# Patient Record
Sex: FEMALE | Race: WHITE | URBAN - METROPOLITAN AREA
[De-identification: names, ages, dates, MRNs, and addresses within clinical notes are randomized per-mention and may not be internally consistent; named-entity substitution may affect disease eponyms.]

---

## 2017-01-18 ENCOUNTER — TELEPHONE (OUTPATIENT)
Dept: FAMILY MEDICINE | Facility: CLINIC | Age: 57
End: 2017-01-18

## 2017-01-18 NOTE — TELEPHONE ENCOUNTER
Panel Management Review      Patient has the following on her problem list:     Asthma review     ACT Total Scores 7/6/2016   ACT TOTAL SCORE -   ASTHMA ER VISITS -   ASTHMA HOSPITALIZATIONS -   ACT TOTAL SCORE (Goal Greater than or Equal to 20) 25   In the past 12 months, how many times did you visit the emergency room for your asthma without being admitted to the hospital? 0   In the past 12 months, how many times were you hospitalized overnight because of your asthma? 0      1. Is Asthma diagnosis on the Problem List? Yes    2. Is Asthma listed on Health Maintenance? Yes    3. Patient is due for:  ACT and AAP    Hypertension   Last three blood pressure readings:  BP Readings from Last 3 Encounters:   11/08/16 148/72   09/16/16 118/78   08/03/16 120/80     Blood pressure: Failed    HTN Guidelines:  Age 18-59 BP range:  Less than 140/90  Age 60-85 with Diabetes:  Less than 140/90  Age 60-85 without Diabetes:  less than 150/90      Composite cancer screening  Chart review shows that this patient is due/due soon for the following None  Summary:    Patient is due/failing the following:   AAP, ACT and BP CHECK    Action needed:   Patient needs office visit for hypertension and asthma.    Type of outreach:    Sent ACTV8 message.    Questions for provider review:    None                                                                                                                                    Bella Dickens, [facility]       Chart routed to Care Team .

## 2017-01-25 NOTE — TELEPHONE ENCOUNTER
Patient informed of the message below, patient states she can not set up appointment at this time because she does not have her calendar, but she will call back to schedule.  Ally Kirk CMA

## 2017-02-14 ENCOUNTER — MYC MEDICAL ADVICE (OUTPATIENT)
Dept: FAMILY MEDICINE | Facility: CLINIC | Age: 57
End: 2017-02-14

## 2017-02-14 NOTE — TELEPHONE ENCOUNTER
"SH, see Mychart message and advise  Wt Readings from Last 2 Encounters:   11/08/16 182 lb (82.6 kg)   09/16/16 193 lb (87.5 kg)     Ht Readings from Last 2 Encounters:   11/08/16 5' 4.75\" (1.645 m)   07/08/16 5' 4.75\" (1.645 m)       Inez Diego RN, BSN  Message handled by Nurse Triage.    "

## 2017-03-19 DIAGNOSIS — J45.20 MILD INTERMITTENT ASTHMA, UNCOMPLICATED: ICD-10-CM

## 2017-03-21 RX ORDER — FLUTICASONE PROPIONATE 50 MCG
SPRAY, SUSPENSION (ML) NASAL
Qty: 16 ML | Refills: 1 | Status: SHIPPED | OUTPATIENT
Start: 2017-03-21 | End: 2017-05-08

## 2017-03-21 NOTE — TELEPHONE ENCOUNTER
Routing refill request to provider to review approval because:  New for , last refill 2015 for asthma diagnosis  Inez Diego RN, BSN  Message handled by Nurse Triage.

## 2017-03-21 NOTE — TELEPHONE ENCOUNTER
fluticasone (FLONASE) 50 MCG/ACT spray      Last Written Prescription Date: 12/29/15  Last Fill Quantity: 1g ,  # refills: 11   Last Office Visit with [facility], [facility] or [facility] prescribing provider: 11/8/2016                                           Next 5 appointments (look out 90 days)     Mar 22, 2017  8:00 AM CDT   Return Visit with Evin Cuevas MD   [facility] ([facility] [place])    [address]  [facility] [address]   [phone]                  EDDIE Hicks  March 21, 2017  8:50 AM

## 2017-03-22 ENCOUNTER — OFFICE VISIT (OUTPATIENT)
Dept: UROLOGY | Facility: CLINIC | Age: 57
End: 2017-03-22
Payer: COMMERCIAL

## 2017-03-22 VITALS — DIASTOLIC BLOOD PRESSURE: 76 MMHG | SYSTOLIC BLOOD PRESSURE: 140 MMHG

## 2017-03-22 DIAGNOSIS — N39.3 FEMALE STRESS INCONTINENCE: Primary | ICD-10-CM

## 2017-03-22 LAB
ALBUMIN UR-MCNC: NEGATIVE MG/DL
APPEARANCE UR: CLEAR
BILIRUB UR QL STRIP: NEGATIVE
COLOR UR AUTO: YELLOW
GLUCOSE UR STRIP-MCNC: NEGATIVE MG/DL
HGB UR QL STRIP: NEGATIVE
KETONES UR STRIP-MCNC: NEGATIVE MG/DL
LEUKOCYTE ESTERASE UR QL STRIP: ABNORMAL
NITRATE UR QL: NEGATIVE
PH UR STRIP: 5.5 PH (ref 5–7)
SP GR UR STRIP: 1.01 (ref 1–1.03)
URN SPEC COLLECT METH UR: ABNORMAL
UROBILINOGEN UR STRIP-ACNC: 0.2 EU/DL (ref 0.2–1)

## 2017-03-22 PROCEDURE — 99213 OFFICE O/P EST LOW 20 MIN: CPT | Performed by: UROLOGY

## 2017-03-22 PROCEDURE — 81003 URINALYSIS AUTO W/O SCOPE: CPT | Mod: QW | Performed by: UROLOGY

## 2017-03-22 NOTE — PROGRESS NOTES
F/u atrophic vaginitis, hx UTI, small erosion after Lynx sling 6/4/12.    Compliant with yara cream 2-3x per week.    Occas scant spotting, none today.     Denies dysuria, gross hematuria, frequency; occas noc x 1. No ERMA. No issues with intimacy. No UTI's since last visit.      Current Outpatient Prescriptions   Medication     fluticasone (FLONASE) 50 MCG/ACT spray     estradiol (ESTRACE VAGINAL) 0.1 MG/GM cream     Cholecalciferol (VITAMIN D) 2000 UNITS tablet     lisinopril (PRINIVIL,ZESTRIL) 10 MG tablet     calcium-vitamin D (CALCIUM 600 + D) 600-400 MG-UNIT per tablet     albuterol (PROAIR HFA, PROVENTIL HFA, VENTOLIN HFA) 108 (90 BASE) MCG/ACT inhaler     fexofenadine (ALLEGRA) 180 MG tablet     omega-3 fatty acids (FISH OIL) 1200 MG capsule     ALEVE 220 MG OR TABS     FLAX SEED OIL 1000 MG OR CAPS     ASPIRIN 81 MG OR TABS     MULTIVITAMIN TABS   OR     No current facility-administered medications for this visit.        PE: Excellent support at . Minimal physiologic discharge, healthy vaginal tissue. Small amnt palpable mesh on left side of urethra.      Results for orders placed or performed in visit on 03/22/17   UA without Microscopic   Result Value Ref Range    Color Urine Yellow     Appearance Urine Clear     Glucose Urine Negative NEG mg/dL    Bilirubin Urine Negative NEG    Ketones Urine Negative NEG mg/dL    Specific Gravity Urine 1.010 1.003 - 1.035    Blood Urine Negative NEG    pH Urine 5.5 5.0 - 7.0 pH    Protein Albumin Urine Negative NEG mg/dL    Urobilinogen Urine 0.2 0.2 - 1.0 EU/dL    Nitrite Urine Negative NEG    Leukocyte Esterase Urine Trace (A) NEG    Source Midstream Urine        IMP:  1. Atrophic vaginitis, improving  2. Hx UTI's, resolved  3. Small mesh erosion, assymptomatic      PLAN:  1. Discussed situation with patient in detail. In light of improving vaginal tissue and lack of negative sx's we'll continue to manage erosion conservatively    2. Continue yara cream thrice  weekly    3. RTC 6 mos to repeat vag exam or sooner prn    4. 15 minutes spent with patient, more than 50% spent in counseling and coordination of care for vaginitis/UTI.    5. Copy S Haase

## 2017-03-22 NOTE — MR AVS SNAPSHOT
After Visit Summary   3/22/2017    Mary Kate Pacheco    MRN: [id number]           Patient Information     Date Of Birth          1960        Visit Information        Provider Department      3/22/2017 8:00 AM Evin Cuevas MD [facility]        Today's Diagnoses     Female stress incontinence    -  1       Follow-ups after your visit        Follow-up notes from your care team     Return in about 6 months (around 9/22/2017).      Your next 10 appointments already scheduled     Sep 20, 2017  8:00 AM CDT   Return Visit with Evin Cuevas MD   [facility] ([facility] [place])    [address] [address] [phone]              Who to contact     If you have questions or need follow up information about today's clinic visit or your schedule please contact [facility] directly at [phone].  Normal or non-critical lab and imaging results will be communicated to you by Tyfonehart, letter or phone within 4 business days after the clinic has received the results. If you do not hear from us within 7 days, please contact the clinic through Spacebart or phone. If you have a critical or abnormal lab result, we will notify you by phone as soon as possible.  Submit refill requests through iConText or call your pharmacy and they will forward the refill request to us. Please allow 3 business days for your refill to be completed.          Additional Information About Your Visit        Tyfonehart Information     iConText gives you secure access to your electronic health record. If you see a primary care provider, you can also send messages to your care team and make appointments. If you have questions, please call your primary care clinic.  If you do not have a primary care provider, please call [phone] and they will assist  you.        Care EveryWhere ID     This is your Care EveryWhere ID. This could be used by other organizations to access your [facility] medical records  [id number]         Blood Pressure from Last 3 Encounters:   03/22/17 140/76   11/08/16 148/72   09/16/16 118/78    Weight from Last 3 Encounters:   11/08/16 182 lb (82.6 kg)   09/16/16 193 lb (87.5 kg)   08/03/16 210 lb (95.3 kg)              We Performed the Following     UA without Microscopic          Today's Medication Changes          These changes are accurate as of: 3/22/17  8:51 AM.  If you have any questions, ask your nurse or doctor.               These medicines have changed or have updated prescriptions.        Dose/Directions    calcium-vitamin D 600-400 MG-UNIT per tablet   Commonly known as:  calcium 600 + D   This may have changed:  when to take this   Used for:  Vitamin D deficiency disease        Dose:  1 tablet   Take 1 tablet by mouth 2 times daily   Quantity:  100 tablet   Refills:  3                Primary Care Provider Office Phone # Fax #    Susan Rachele Haase, APRN [phone]       [address] [address]        Thank you!     Thank you for choosing [facility]  for your care. Our goal is always to provide you with excellent care. Hearing back from our patients is one way we can continue to improve our services. Please take a few minutes to complete the written survey that you may receive in the mail after your visit with us. Thank you!             Your Updated Medication List - Protect others around you: Learn how to safely use, store and throw away your medicines at www.disposemymeds.org.          This list is accurate as of: 3/22/17  8:51 AM.  Always use your most recent med list.                   Brand Name Dispense Instructions for use    albuterol 108 (90 BASE) MCG/ACT Inhaler    PROAIR HFA/PROVENTIL HFA/VENTOLIN HFA    1 Inhaler     Inhale 2 puffs into the lungs every 4 hours as needed for shortness of breath / dyspnea       ALEVE 220 MG tablet   Generic drug:  naproxen sodium      1 TABLET EVERY 12 HOURS AS NEEDED       aspirin 81 MG tablet      1 tab po QD (Once per day)       calcium-vitamin D 600-400 MG-UNIT per tablet    calcium 600 + D    100 tablet    Take 1 tablet by mouth 2 times daily       estradiol 0.1 MG/GM cream    ESTRACE VAGINAL    42.5 g    Place 2 g vaginally twice a week       fexofenadine 180 MG tablet    ALLEGRA    90 tablet    Take 1 tablet (180 mg) by mouth daily       flax seed oil 1000 MG capsule      1 tab qd       fluticasone 50 MCG/ACT spray    FLONASE    16 mL    USE TWO SPRAYS IN EACH NOSTRIL DAILY       lisinopril 10 MG tablet    PRINIVIL/ZESTRIL    90 tablet    Take 1 tablet (10 mg) by mouth daily       MULTIVITAMIN TABS   OR      1 DAY       omega-3 fatty acids 1200 MG capsule     180 capsule    Take 1 capsule by mouth daily       vitamin D 2000 UNITS tablet     100 tablet    Take 2,000 Units by mouth daily

## 2017-05-08 ENCOUNTER — OFFICE VISIT (OUTPATIENT)
Dept: FAMILY MEDICINE | Facility: CLINIC | Age: 57
End: 2017-05-08
Payer: COMMERCIAL

## 2017-05-08 VITALS
HEIGHT: 65 IN | TEMPERATURE: 97.8 F | DIASTOLIC BLOOD PRESSURE: 70 MMHG | OXYGEN SATURATION: 99 % | RESPIRATION RATE: 10 BRPM | SYSTOLIC BLOOD PRESSURE: 120 MMHG | BODY MASS INDEX: 25.99 KG/M2 | HEART RATE: 66 BPM | WEIGHT: 156 LBS

## 2017-05-08 DIAGNOSIS — I10 HYPERTENSION GOAL BP (BLOOD PRESSURE) < 140/90: ICD-10-CM

## 2017-05-08 DIAGNOSIS — Z00.00 WELLNESS EXAMINATION: Primary | ICD-10-CM

## 2017-05-08 DIAGNOSIS — E55.9 VITAMIN D DEFICIENCY DISEASE: ICD-10-CM

## 2017-05-08 DIAGNOSIS — J45.20 MILD INTERMITTENT ASTHMA, UNCOMPLICATED: ICD-10-CM

## 2017-05-08 LAB
ALBUMIN SERPL-MCNC: 4.1 G/DL (ref 3.4–5)
ALP SERPL-CCNC: 90 U/L (ref 40–150)
ALT SERPL W P-5'-P-CCNC: 19 U/L (ref 0–50)
ANION GAP SERPL CALCULATED.3IONS-SCNC: 5 MMOL/L (ref 3–14)
AST SERPL W P-5'-P-CCNC: 17 U/L (ref 0–45)
BASOPHILS # BLD AUTO: 0.1 10E9/L (ref 0–0.2)
BASOPHILS NFR BLD AUTO: 1.4 %
BILIRUB SERPL-MCNC: 0.4 MG/DL (ref 0.2–1.3)
BUN SERPL-MCNC: 17 MG/DL (ref 7–30)
CALCIUM SERPL-MCNC: 9.4 MG/DL (ref 8.5–10.1)
CHLORIDE SERPL-SCNC: 105 MMOL/L (ref 94–109)
CHOLEST SERPL-MCNC: 223 MG/DL
CO2 SERPL-SCNC: 30 MMOL/L (ref 20–32)
CREAT SERPL-MCNC: 0.68 MG/DL (ref 0.52–1.04)
DEPRECATED CALCIDIOL+CALCIFEROL SERPL-MC: 64 UG/L (ref 20–75)
DIFFERENTIAL METHOD BLD: NORMAL
EOSINOPHIL # BLD AUTO: 0.2 10E9/L (ref 0–0.7)
EOSINOPHIL NFR BLD AUTO: 3.6 %
ERYTHROCYTE [DISTWIDTH] IN BLOOD BY AUTOMATED COUNT: 12.8 % (ref 10–15)
GFR SERPL CREATININE-BSD FRML MDRD: 88 ML/MIN/1.7M2
GLUCOSE SERPL-MCNC: 90 MG/DL (ref 70–99)
HCT VFR BLD AUTO: 40.6 % (ref 35–47)
HCV AB SERPL QL IA: NORMAL
HDLC SERPL-MCNC: 70 MG/DL
HGB BLD-MCNC: 13.5 G/DL (ref 11.7–15.7)
LDLC SERPL CALC-MCNC: 136 MG/DL
LYMPHOCYTES # BLD AUTO: 2.4 10E9/L (ref 0.8–5.3)
LYMPHOCYTES NFR BLD AUTO: 40.8 %
MCH RBC QN AUTO: 30.3 PG (ref 26.5–33)
MCHC RBC AUTO-ENTMCNC: 33.3 G/DL (ref 31.5–36.5)
MCV RBC AUTO: 91 FL (ref 78–100)
MICRO REPORT STATUS: NORMAL
MONOCYTES # BLD AUTO: 0.7 10E9/L (ref 0–1.3)
MONOCYTES NFR BLD AUTO: 11.7 %
NEUTROPHILS # BLD AUTO: 2.5 10E9/L (ref 1.6–8.3)
NEUTROPHILS NFR BLD AUTO: 42.5 %
NONHDLC SERPL-MCNC: 153 MG/DL
PLATELET # BLD AUTO: 251 10E9/L (ref 150–450)
POTASSIUM SERPL-SCNC: 3.8 MMOL/L (ref 3.4–5.3)
PROT SERPL-MCNC: 7.6 G/DL (ref 6.8–8.8)
RBC # BLD AUTO: 4.46 10E12/L (ref 3.8–5.2)
SODIUM SERPL-SCNC: 140 MMOL/L (ref 133–144)
SPECIMEN SOURCE: NORMAL
TRIGL SERPL-MCNC: 84 MG/DL
TSH SERPL DL<=0.005 MIU/L-ACNC: 2.15 MU/L (ref 0.4–4)
WBC # BLD AUTO: 5.8 10E9/L (ref 4–11)
WET PREP SPEC: NORMAL

## 2017-05-08 PROCEDURE — 99396 PREV VISIT EST AGE 40-64: CPT | Performed by: NURSE PRACTITIONER

## 2017-05-08 PROCEDURE — 87210 SMEAR WET MOUNT SALINE/INK: CPT | Performed by: NURSE PRACTITIONER

## 2017-05-08 PROCEDURE — 80061 LIPID PANEL: CPT | Performed by: NURSE PRACTITIONER

## 2017-05-08 PROCEDURE — 82306 VITAMIN D 25 HYDROXY: CPT | Performed by: NURSE PRACTITIONER

## 2017-05-08 PROCEDURE — 36415 COLL VENOUS BLD VENIPUNCTURE: CPT | Performed by: NURSE PRACTITIONER

## 2017-05-08 PROCEDURE — 80050 GENERAL HEALTH PANEL: CPT | Performed by: NURSE PRACTITIONER

## 2017-05-08 PROCEDURE — 86803 HEPATITIS C AB TEST: CPT | Performed by: NURSE PRACTITIONER

## 2017-05-08 RX ORDER — ALBUTEROL SULFATE 90 UG/1
2 AEROSOL, METERED RESPIRATORY (INHALATION) EVERY 4 HOURS PRN
Qty: 1 INHALER | Refills: 11 | Status: SHIPPED | OUTPATIENT
Start: 2017-05-08 | End: 2018-04-18

## 2017-05-08 RX ORDER — FLUTICASONE PROPIONATE 50 MCG
2 SPRAY, SUSPENSION (ML) NASAL DAILY
Qty: 16 ML | Refills: 11 | Status: SHIPPED | OUTPATIENT
Start: 2017-05-08

## 2017-05-08 RX ORDER — LISINOPRIL 10 MG/1
10 TABLET ORAL DAILY
Qty: 90 TABLET | Refills: 3 | Status: SHIPPED | OUTPATIENT
Start: 2017-05-08 | End: 2018-04-11

## 2017-05-08 NOTE — PROGRESS NOTES
"   SUBJECTIVE:     CC: Mary Kate Pacheco is an 56 year old woman who presents for preventive health visit.     Physical   Annual:     Getting at least 3 servings of Calcium per day::  NO    Bi-annual eye exam::  Yes    Dental care twice a year::  Yes    Sleep apnea or symptoms of sleep apnea::  None    Diet::  Other    Frequency of exercise::  4-5 days/week    Duration of exercise::  Less than 15 minutes    Taking medications regularly::  Yes    Medication side effects::  Not applicable and None    Additional concerns today::  YES    Additional concerns:   Left shoulder: patient reports that her left shoulder feels \"bumpy\" and is itchy around the area. She has tried to keep it hydrated with lotion, but has not tried any hydrocortisone creams.    Vaginal symptoms: she reports of spotting with some discharge. She is followed by urology who instructed her to use an estrogen cream a couple times per day. Was told that her \"vaginal walls are possibly thinning out\" that could be linked to the spotting. Has a history of a total hysterectomy      Weight loss: patient has a lost a total of 81lbs since starting the [facility] [employer] weight loss program. She has been taking meal replacements with fruit, snacks on protein bars, has lunch full of veggies with healthy fats and lean protein, and well as starches. Her goal is to weigh around 150lbs     Today's PHQ-2 Score:   PHQ-2 ( 1999 Pfizer) 5/1/2017   Little interest or pleasure in doing things Not at all   Feeling down, depressed or hopeless Not at all   PHQ-2 Score 0       Abuse: Current or Past(Physical, Sexual or Emotional)- No  Do you feel safe in your environment - Yes    Social History   Substance Use Topics     Smoking status: Never Smoker     Smokeless tobacco: Never Used     Alcohol use 0.0 oz/week     0 Standard drinks or equivalent per week      Comment: very rarely     The patient does not drink >3 drinks per day nor >7 drinks per week.    Recent Labs   Lab Test  " 10/12/15   0914  10/02/14   1057   CHOL  206*  218*   HDL  60  66   LDL  122  134*   TRIG  121  92   CHOLHDLRATIO  3.4  3.3       Reviewed orders with patient.  Reviewed health maintenance and updated orders accordingly - Yes    Mammo Decision Support:  Patient over age 50, mutual decision to screen reflected in health maintenance.    Pertinent mammograms are reviewed under the imaging tab.  History of abnormal Pap smear: Status post benign hysterectomy. Health Maintenance and Surgical History updated.    Reviewed and updated as needed this visit by clinical staff  Tobacco  Allergies  Med Hx  Surg Hx  Fam Hx  Soc Hx        Reviewed and updated as needed this visit by Provider            ROS:  C: NEGATIVE for fever, chills, change in weight  INTEGUMENTARY/SKIN: See health history   E: NEGATIVE for vision changes or irritation  ENT: NEGATIVE for ear, mouth and throat problems  R: NEGATIVE for significant cough or SOB  B: NEGATIVE for masses, tenderness or discharge  CV: NEGATIVE for chest pain, palpitations or peripheral edema  GI: NEGATIVE for nausea, abdominal pain, heartburn, or change in bowel habits   menopausal female: See health history   M: NEGATIVE for significant arthralgias or myalgia  N: NEGATIVE for weakness, dizziness or paresthesias  P: NEGATIVE for changes in mood or affect     Problem list, Medication list, Allergies, and Medical/Social/Surgical histories reviewed in EPIC and updated as appropriate.  BP Readings from Last 3 Encounters:   05/08/17 120/70   03/22/17 140/76   11/08/16 148/72    Wt Readings from Last 3 Encounters:   05/08/17 156 lb (70.8 kg)   11/08/16 182 lb (82.6 kg)   09/16/16 193 lb (87.5 kg)               Patient Active Problem List   Diagnosis     Allergic rhinitis     Premature menopause     [email or web address]     Acute pancreatitis     Female stress incontinence     Mild intermittent asthma     HYPERLIPIDEMIA LDL GOAL <160     Obesity     Hypertension goal BP (blood pressure) <  140/90     Vitamin D deficiency disease     Past Surgical History:   Procedure Laterality Date     C LAMINEC/FACETECT/FORAMIN,LUMBAR 1 SEG  2004    L5-S1     C NONSPECIFIC PROCEDURE      HUAN & BSO     C NONSPECIFIC PROCEDURE      TL     C NONSPECIFIC PROCEDURE      breast reduction     C NONSPECIFIC PROCEDURE      ? kidney stones     C TOTAL ABDOM HYSTERECTOMY  1996     and BSO.  Benign reasons.      CHOLECYSTECTOMY, LAPOROSCOPIC  2007     SLING TRANSPUBO WITHOUT ANTERIOR COLPORRHAPHY  2012    Procedure:SLING TRANSPUBO WITHOUT ANTERIOR COLPORRHAPHY; Pubo Vaginal Sling; Surgeon:SWATI VIEYRA; Location:[facility] OR     SURGICAL HISTORY OF 06    L5-S1 anterior and posterior fusion       Social History   Substance Use Topics     Smoking status: Never Smoker     Smokeless tobacco: Never Used     Alcohol use 0.0 oz/week     0 Standard drinks or equivalent per week      Comment: very rarely     Family History   Problem Relation Age of Onset     Hypertension Father      glaucoma     Thyroid Disease Father      elevated thyroid     HEART DISEASE Father      pacemaker in his late 70's for bradycardia      Family History Negative Mother      Cancer - colorectal Maternal Grandmother      late 70's     C.A.D. Maternal Grandmother       83 MI and CVA     OSTEOPOROSIS Maternal Grandmother      Alzheimer Disease Paternal Grandmother       at 93     Circulatory Paternal Grandfather       brain aneurysm in his 40's      Family History Negative Sister      Family History Negative Sister      Family History Negative Son      Family History Negative Son      Family History Negative Daughter          Current Outpatient Prescriptions   Medication Sig Dispense Refill     fluticasone (FLONASE) 50 MCG/ACT spray USE TWO SPRAYS IN EACH NOSTRIL DAILY 16 mL 1     estradiol (ESTRACE VAGINAL) 0.1 MG/GM cream Place 2 g vaginally twice a week 42.5 g 3     Cholecalciferol (VITAMIN D) 2000 UNITS tablet Take  2,000 Units by mouth daily 100 tablet 3     lisinopril (PRINIVIL,ZESTRIL) 10 MG tablet Take 1 tablet (10 mg) by mouth daily 90 tablet 3     calcium-vitamin D (CALCIUM 600 + D) 600-400 MG-UNIT per tablet Take 1 tablet by mouth 2 times daily (Patient taking differently: Take 1 tablet by mouth daily ) 100 tablet 3     albuterol (PROAIR HFA, PROVENTIL HFA, VENTOLIN HFA) 108 (90 BASE) MCG/ACT inhaler Inhale 2 puffs into the lungs every 4 hours as needed for shortness of breath / dyspnea 1 Inhaler 11     fexofenadine (ALLEGRA) 180 MG tablet Take 1 tablet (180 mg) by mouth daily 90 tablet 3     omega-3 fatty acids (FISH OIL) 1200 MG capsule Take 1 capsule by mouth daily 180 capsule 12     ALEVE 220 MG OR TABS 1 TABLET EVERY 12 HOURS AS NEEDED       FLAX SEED OIL 1000 MG OR CAPS 1 tab qd       ASPIRIN 81 MG OR TABS 1 tab po QD (Once per day)       MULTIVITAMIN TABS   OR 1 DAY       Allergies   Allergen Reactions     Dust Mites      itchy runny nose sneezing     Ibuprofen      cancer sores with high doses     Mold      itchy runny nose sneezing     Seasonal Allergies      itchy runny nose sneezing     Recent Labs   Lab Test  10/12/15   0914  10/02/14   1057  09/26/13   1906   LDL  122  134*  121   HDL  60  66  58   TRIG  121  92  101   ALT  22  19  33   CR  0.78  0.78  0.75   GFRESTIMATED  76  77  80   GFRESTBLACK  >90   GFR Calc    >90   GFR Calc    >90   POTASSIUM  4.2  4.6  3.8   TSH  1.50  1.21  2.24      This document serves as a record of the services and decisions personally performed and made by Susan Haase, APRN CNP. It was created on her behalf by Jes Mendoza, a trained medical scribe. The creation of this document is based on the scribe's personal observations and the provider's statements to the medical scribe.  Jes Mendoza, May 8, 2017 7:44 AM    OBJECTIVE:     /70 (BP Location: Left arm, Patient Position: Chair, Cuff Size: Adult Regular)  Pulse 66  Temp 97.8  F (36.6  C)  "(Oral)  Resp 10  Ht 5' 4.75\" (1.645 m)  Wt 156 lb (70.8 kg)  SpO2 99%  BMI 26.16 kg/m2  EXAM:  GENERAL APPEARANCE: obese, alert and no distress  EYES: Eyes grossly normal to inspection, PERRL and conjunctivae and sclerae normal  HENT: ear canals and TM's normal, nose and mouth without ulcers or lesions, oropharynx clear and oral mucous membranes moist  NECK: no adenopathy, no asymmetry, masses, or scars and thyroid normal to palpation  RESP: lungs clear to auscultation - no rales, rhonchi or wheezes  BREAST: normal without masses, tenderness or nipple discharge and no palpable axillary masses or adenopathy  CV: regular rate and rhythm, normal S1 S2, no S3 or S4, no murmur, click or rub, no peripheral edema and peripheral pulses strong  ABDOMEN: soft, nontender, no hepatosplenomegaly, no masses and bowel sounds normal   (female): normal female external genitalia, normal urethral meatus, vaginal mucosal atrophy noted, uterus surgically absent, No masses or abnormal discharge  MS: no musculoskeletal defects are noted and gait is age appropriate without ataxia  SKIN: no suspicious lesions or rashes  NEURO: Normal strength and tone, sensory exam grossly normal, mentation intact and speech normal  PSYCH: mentation appears normal and affect normal/bright    ASSESSMENT/PLAN:       Mary Kate was seen today for physical and pre visit planning - done.    Diagnoses and all orders for this visit:    Wellness examination  -     Comprehensive metabolic panel  -     Lipid panel reflex to direct LDL  -     CBC with platelets differential  -     TSH with free T4 reflex  -     Vitamin D Deficiency  -     Hepatitis C Screen Reflex to HCV RNA Quant and Genotype  -     Wet prep    Hypertension goal BP (blood pressure) < 140/90: w ell controlled on lisinopril  -     Comprehensive metabolic panel     lisinopril (PRINIVIL/ZESTRIL) 10 MG tablet; Take 1 tablet (10 mg) by mouth daily  Vitamin D deficiency disease    Mild intermittent " "asthma, uncomplicated:  Well controlled  -     fluticasone (FLONASE) 50 MCG/ACT spray; Spray 2 sprays into both nostrils daily  -     albuterol (PROAIR HFA/PROVENTIL HFA/VENTOLIN HFA) 108 (90 BASE) MCG/ACT Inhaler; Inhale 2 puffs into the lungs every 4 hours as needed for shortness of breath / dyspnea          COUNSELING:  Special attention given to:        Regular exercise       Healthy diet/nutrition       Consider Hep C screening for patients born between 1945 and 1965       reports that she has never smoked. She has never used smokeless tobacco.  Estimated body mass index is 26.16 kg/(m^2) as calculated from the following:    Height as of this encounter: 5' 4.75\" (1.645 m).    Weight as of this encounter: 156 lb (70.8 kg).   Weight management plan: healthy diet and exercise    Counseling Resources:  ATP IV Guidelines  Pooled Cohorts Equation Calculator  Breast Cancer Risk Calculator  FRAX Risk Assessment  ICSI Preventive Guidelines  Dietary Guidelines for Americans, 2010  USDA's MyPlate  ASA Prophylaxis  Lung CA Screening    The information in this document, created by the medical scribe for me, accurately reflects the services I personally performed and the decisions made by me. I have reviewed and approved this document for accuracy prior to leaving the patient care area.  Follow up in 1 year, sooner as needed.  Susan Haase, APRN [facility]"

## 2017-05-08 NOTE — MR AVS SNAPSHOT
After Visit Summary   5/8/2017    Mary Kate Pacheco    MRN: [id number]           Patient Information     Date Of Birth          1960        Visit Information        Provider Department      5/8/2017 7:30 AM Haase, Susan Rachele, APRN CNP [facility]        Today's Diagnoses     Wellness examination    -  1    Hypertension goal BP (blood pressure) < 140/90        Vitamin D deficiency disease        Mild intermittent asthma without complication        Mild intermittent asthma, uncomplicated        Essential hypertension with goal blood pressure less than 140/90           Follow-ups after your visit        Follow-up notes from your care team     Return in about 1 year (around 5/8/2018).      Your next 10 appointments already scheduled     Sep 20, 2017  8:00 AM CDT   Return Visit with Evin Cuevas MD   [facility] ([facility] [place])    [address]  [facility] [phone]              Who to contact     If you have questions or need follow up information about today's clinic visit or your schedule please contact [facility] directly at [phone].  Normal or non-critical lab and imaging results will be communicated to you by MyChart, letter or phone within 4 business days after the clinic has received the results. If you do not hear from us within 7 days, please contact the clinic through MyChart or phone. If you have a critical or abnormal lab result, we will notify you by phone as soon as possible.  Submit refill requests through [employer]Greek or call your pharmacy and they will forward the refill request to us. Please allow 3 business days for your refill to be completed.          Additional Information About Your Visit        MyChart Information     myGreek gives you secure access to your electronic health record. If you see a primary care provider, you  "can also send messages to your care team and make appointments. If you have questions, please call your primary care clinic.  If you do not have a primary care provider, please call [phone] and they will assist you.        Care EveryWhere ID     This is your Care EveryWhere ID. This could be used by other organizations to access your [facility] medical records  [id number]        Your Vitals Were     Pulse Temperature Respirations Height Pulse Oximetry BMI (Body Mass Index)    66 97.8  F (36.6  C) (Oral) 10 5' 4.75\" (1.645 m) 99% 26.16 kg/m2       Blood Pressure from Last 3 Encounters:   05/08/17 120/70   03/22/17 140/76   11/08/16 148/72    Weight from Last 3 Encounters:   05/08/17 156 lb (70.8 kg)   11/08/16 182 lb (82.6 kg)   09/16/16 193 lb (87.5 kg)              We Performed the Following     CBC with platelets differential     Comprehensive metabolic panel     Hepatitis C Screen Reflex to HCV RNA Quant and Genotype     Lipid panel reflex to direct LDL     TSH with free T4 reflex     Vitamin D Deficiency     Wet prep          Today's Medication Changes          These changes are accurate as of: 5/8/17  8:09 AM.  If you have any questions, ask your nurse or doctor.               These medicines have changed or have updated prescriptions.        Dose/Directions    calcium-vitamin D 600-400 MG-UNIT per tablet   Commonly known as:  calcium 600 + D   This may have changed:  when to take this   Used for:  Vitamin D deficiency disease        Dose:  1 tablet   Take 1 tablet by mouth 2 times daily   Quantity:  100 tablet   Refills:  3       fluticasone 50 MCG/ACT spray   Commonly known as:  FLONASE   This may have changed:  See the new instructions.   Used for:  Mild intermittent asthma, uncomplicated   Changed by:  Haase, Susan Rachele, APRN CNP        Dose:  2 spray   Spray 2 sprays into both nostrils daily   Quantity:  16 mL   Refills:  11            Where to get your medicines      These medications were sent to " [facility] [address] [facility] - [place], [place] - [address] [facility]  [address] [facility] [address]     Phone:  [phone]     albuterol 108 (90 BASE) MCG/ACT Inhaler    fluticasone 50 MCG/ACT spray    lisinopril 10 MG tablet                Primary Care Provider Office Phone # Fax #    Susan Rachele Haase, APRN -[phone]       [facility] [address]  [facility] [address]        Thank you!     Thank you for choosing [facility]  for your care. Our goal is always to provide you with excellent care. Hearing back from our patients is one way we can continue to improve our services. Please take a few minutes to complete the written survey that you may receive in the mail after your visit with us. Thank you!             Your Updated Medication List - Protect others around you: Learn how to safely use, store and throw away your medicines at www.disposemymeds.org.          This list is accurate as of: 5/8/17  8:09 AM.  Always use your most recent med list.                   Brand Name Dispense Instructions for use    albuterol 108 (90 BASE) MCG/ACT Inhaler    PROAIR HFA/PROVENTIL HFA/VENTOLIN HFA    1 Inhaler    Inhale 2 puffs into the lungs every 4 hours as needed for shortness of breath / dyspnea       ALEVE 220 MG tablet   Generic drug:  naproxen sodium      1 TABLET EVERY 12 HOURS AS NEEDED       aspirin 81 MG tablet      1 tab po QD (Once per day)       calcium-vitamin D 600-400 MG-UNIT per tablet    calcium 600 + D    100 tablet    Take 1 tablet by mouth 2 times daily       estradiol 0.1 MG/GM cream    ESTRACE VAGINAL    42.5 g    Place 2 g vaginally twice a week       fexofenadine 180 MG tablet    ALLEGRA    90 tablet    Take 1 tablet (180 mg) by mouth daily       flax seed oil 1000 MG capsule      1 tab qd       fluticasone 50 MCG/ACT spray    FLONASE    16 mL    Spray 2 sprays into both nostrils daily       lisinopril 10 MG tablet    PRINIVIL/ZESTRIL    90  tablet    Take 1 tablet (10 mg) by mouth daily       MULTIVITAMIN TABS   OR      1 DAY       omega-3 fatty acids 1200 MG capsule     180 capsule    Take 1 capsule by mouth daily       vitamin D 2000 UNITS tablet     100 tablet    Take 2,000 Units by mouth daily

## 2017-05-08 NOTE — NURSING NOTE
"Chief Complaint   Patient presents with     Physical     Pre Visit Planning - Done       Initial /70 (BP Location: Left arm, Patient Position: Chair, Cuff Size: Adult Regular)  Pulse 66  Temp 97.8  F (36.6  C) (Oral)  Resp 10  Ht 5' 4.75\" (1.645 m)  Wt 156 lb (70.8 kg)  SpO2 99%  BMI 26.16 kg/m2 Estimated body mass index is 26.16 kg/(m^2) as calculated from the following:    Height as of this encounter: 5' 4.75\" (1.645 m).    Weight as of this encounter: 156 lb (70.8 kg).  Medication Reconciliation: complete   Bella Dickens CMA      "

## 2017-05-08 NOTE — PROGRESS NOTES
Marcin Hartmann,  Your CBC (checks for infection and anemia) was normal.  Sincerely,     Susan Haase, CNP

## 2017-05-09 ASSESSMENT — ASTHMA QUESTIONNAIRES: ACT_TOTALSCORE: 25

## 2017-05-09 NOTE — PROGRESS NOTES
Marcin Hartmann,  Your lab results are as below:  1)  TSH (thyroid level) 2.15 which is normal (range 0.4-4)  2)  Cholesterol was elevated at 223, your LDL (bad cholesterol) and your HDL (good cholesterol) were within normal range. Continue to follow a low cholesterol diet and we will recheck this in 1 year.  3)  Glucose was normal at 90  (normal fasting is <100).  4)  Vitamin D level was normal at 64.   5)  Hepatitis C test was negative.    If you have any questions do not hesitate to call the clinic to discuss the results with me further.     Sincerely,    Susan Haase, CNP

## 2017-05-16 DIAGNOSIS — J45.20 MILD INTERMITTENT ASTHMA, UNCOMPLICATED: ICD-10-CM

## 2017-05-16 NOTE — TELEPHONE ENCOUNTER
FLUTICASONE PROP 50 MCG SPRAY       Last Written Prescription Date: 05/08/2017  Last Fill Quantity: , #16 ml  refills: 11    Last Office Visit with G, [facility] or [facility] prescribing provider:  05/08/2017 Susan Haase.   Future Office Visit:       Date of Last Asthma Action Plan Letter:   Asthma Action Plan Q1 Year    Topic Date Due     Asthma Action Plan - yearly  01/18/2018      Asthma Control Test:   ACT Total Scores 5/8/2017   ACT TOTAL SCORE (Goal Greater than or Equal to 20) 25   In the past 12 months, how many times did you visit the emergency room for your asthma without being admitted to the hospital? 0   In the past 12 months, how many times were you hospitalized overnight because of your asthma? 0       Date of Last Spirometry Test:   No results found for this or any previous visit.

## 2017-05-18 RX ORDER — FLUTICASONE PROPIONATE 50 MCG
SPRAY, SUSPENSION (ML) NASAL
Qty: 16 ML | Refills: 11 | Status: SHIPPED | OUTPATIENT
Start: 2017-05-18 | End: 2017-09-26

## 2017-06-05 ENCOUNTER — MYC MEDICAL ADVICE (OUTPATIENT)
Dept: FAMILY MEDICINE | Facility: CLINIC | Age: 57
End: 2017-06-05

## 2017-06-05 NOTE — TELEPHONE ENCOUNTER
SH, see Mychart message and advise chigger bites treatment  Inez Diego RN, BSN  Message handled by Nurse Triage.

## 2017-07-18 DIAGNOSIS — I10 ESSENTIAL HYPERTENSION WITH GOAL BLOOD PRESSURE LESS THAN 140/90: ICD-10-CM

## 2017-07-18 NOTE — TELEPHONE ENCOUNTER
Pending Prescriptions:                       Disp   Refills    lisinopril (PRINIVIL/ZESTRIL) 10 MG table*90 tab*2            Sig: TAKE 1 TABLET (10 MG) BY MOUTH DAILY              Last Written Prescription Date: 5/8/2017  Last Fill Quantity: 90, # refills: 3  Last Office Visit with [facility], P or [facility] prescribing provider: 5/8/2017, Haase       Potassium   Date Value Ref Range Status   05/08/2017 3.8 3.4 - 5.3 mmol/L Final     Creatinine   Date Value Ref Range Status   05/08/2017 0.68 0.52 - 1.04 mg/dL Final     BP Readings from Last 3 Encounters:   05/08/17 120/70   03/22/17 140/76   11/08/16 148/72

## 2017-07-20 RX ORDER — LISINOPRIL 10 MG/1
TABLET ORAL
Qty: 90 TABLET | Refills: 2 | OUTPATIENT
Start: 2017-07-20

## 2017-09-26 ENCOUNTER — TELEPHONE (OUTPATIENT)
Dept: OBGYN | Facility: CLINIC | Age: 57
End: 2017-09-26

## 2017-09-26 ENCOUNTER — OFFICE VISIT (OUTPATIENT)
Dept: OBGYN | Facility: CLINIC | Age: 57
End: 2017-09-26
Payer: COMMERCIAL

## 2017-09-26 VITALS
HEIGHT: 65 IN | SYSTOLIC BLOOD PRESSURE: 148 MMHG | BODY MASS INDEX: 26.82 KG/M2 | WEIGHT: 161 LBS | DIASTOLIC BLOOD PRESSURE: 76 MMHG

## 2017-09-26 DIAGNOSIS — T83.711A: Primary | ICD-10-CM

## 2017-09-26 DIAGNOSIS — R39.15 URGENCY OF URINATION: ICD-10-CM

## 2017-09-26 DIAGNOSIS — N39.41 URGE INCONTINENCE: ICD-10-CM

## 2017-09-26 PROCEDURE — 99204 OFFICE O/P NEW MOD 45 MIN: CPT | Performed by: OBSTETRICS & GYNECOLOGY

## 2017-09-26 ASSESSMENT — ANXIETY QUESTIONNAIRES
IF YOU CHECKED OFF ANY PROBLEMS ON THIS QUESTIONNAIRE, HOW DIFFICULT HAVE THESE PROBLEMS MADE IT FOR YOU TO DO YOUR WORK, TAKE CARE OF THINGS AT HOME, OR GET ALONG WITH OTHER PEOPLE: NOT DIFFICULT AT ALL
2. NOT BEING ABLE TO STOP OR CONTROL WORRYING: NOT AT ALL
GAD7 TOTAL SCORE: 0
7. FEELING AFRAID AS IF SOMETHING AWFUL MIGHT HAPPEN: NOT AT ALL
3. WORRYING TOO MUCH ABOUT DIFFERENT THINGS: NOT AT ALL
6. BECOMING EASILY ANNOYED OR IRRITABLE: NOT AT ALL
5. BEING SO RESTLESS THAT IT IS HARD TO SIT STILL: NOT AT ALL
1. FEELING NERVOUS, ANXIOUS, OR ON EDGE: NOT AT ALL

## 2017-09-26 ASSESSMENT — PATIENT HEALTH QUESTIONNAIRE - PHQ9
SUM OF ALL RESPONSES TO PHQ QUESTIONS 1-9: 0
5. POOR APPETITE OR OVEREATING: NOT AT ALL

## 2017-09-26 NOTE — MR AVS SNAPSHOT
"              After Visit Summary   9/26/2017    Mary Kate Pacheco    MRN: [id number]           Patient Information     Date Of Birth          1960        Visit Information        Provider Department      9/26/2017 9:00 AM Artemio Bhatti MD [facility]        Today's Diagnoses     Vaginal erosion due to surgical mesh, initial encounter (H)    -  1    Urgency of urination        Urge incontinence           Follow-ups after your visit        Who to contact     If you have questions or need follow up information about today's clinic visit or your schedule please contact [facility] directly at [phone].  Normal or non-critical lab and imaging results will be communicated to you by MyChart, letter or phone within 4 business days after the clinic has received the results. If you do not hear from us within 7 days, please contact the clinic through Compare[employer]way[employer] or phone. If you have a critical or abnormal lab result, we will notify you by phone as soon as possible.  Submit refill requests through [employer] or call your pharmacy and they will forward the refill request to us. Please allow 3 business days for your refill to be completed.          Additional Information About Your Visit        MyChart Information     [facility] [facility] gives you secure access to your electronic health record. If you see a primary care provider, you can also send messages to your care team and make appointments. If you have questions, please call your primary care clinic.  If you do not have a primary care provider, please call [phone] and they will assist you.        Care EveryWhere ID     This is your Care EveryWhere ID. This could be used by other organizations to access your [facility] medical records  [id number]        Your Vitals Were     Height Breastfeeding? BMI (Body Mass Index)             5' 4.75\" (1.645 m) No 27 kg/m2          Blood Pressure from Last 3 Encounters:   09/26/17 148/76 "   05/08/17 120/70   03/22/17 140/76    Weight from Last 3 Encounters:   09/26/17 161 lb (73 kg)   05/08/17 156 lb (70.8 kg)   11/08/16 182 lb (82.6 kg)              Today, you had the following     No orders found for display         Today's Medication Changes          These changes are accurate as of: 9/26/17 10:03 AM.  If you have any questions, ask your nurse or doctor.               These medicines have changed or have updated prescriptions.        Dose/Directions    calcium-vitamin D 600-400 MG-UNIT per tablet   Commonly known as:  calcium 600 + D   This may have changed:  when to take this   Used for:  Vitamin D deficiency disease        Dose:  1 tablet   Take 1 tablet by mouth 2 times daily   Quantity:  100 tablet   Refills:  3                Primary Care Provider Office Phone # Fax #    Chayo Seymour Haase, APRN -[phone]       [address] [facility] [address]        Equal Access to Services     LESTER KING AH: Hadii aad ku hadasho Sonadeen, waaxda luqadaha, qaybta kaalmada adeegyada, iveth mills . So [facility] [phone].    ATENCIÓN: Si tomla prabhu, tiene a steen disposición servicios gratuitos de asistencia lingüística. Llame al [phone].    We comply with applicable federal civil rights laws and [place] laws. We do not discriminate on the basis of race, color, national origin, age, disability sex, sexual orientation or gender identity.            Thank you!     Thank you for choosing [facility] [facility]  for your care. Our goal is always to provide you with excellent care. Hearing back from our patients is one way we can continue to improve our services. Please take a few minutes to complete the written survey that you may receive in the mail after your visit with us. Thank you!             Your Updated Medication List - Protect others around you: Learn how to safely use, store and throw away your medicines at www.disposemymeds.org.           This list is accurate as of: 9/26/17 10:03 AM.  Always use your most recent med list.                   Brand Name Dispense Instructions for use Diagnosis    albuterol 108 (90 BASE) MCG/ACT Inhaler    PROAIR HFA/PROVENTIL HFA/VENTOLIN HFA    1 Inhaler    Inhale 2 puffs into the lungs every 4 hours as needed for shortness of breath / dyspnea    Mild intermittent asthma, uncomplicated       ALEVE 220 MG tablet   Generic drug:  naproxen sodium      1 TABLET EVERY 12 HOURS AS NEEDED    Thoracic or lumbosacral neuritis or radiculitis, unspecified       aspirin 81 MG tablet      1 tab po QD (Once per day)        calcium-vitamin D 600-400 MG-UNIT per tablet    calcium 600 + D    100 tablet    Take 1 tablet by mouth 2 times daily    Vitamin D deficiency disease       fexofenadine 180 MG tablet    ALLEGRA    90 tablet    Take 1 tablet (180 mg) by mouth daily    Allergic rhinitis, cause unspecified       flax seed oil 1000 MG capsule      1 tab qd        fluticasone 50 MCG/ACT spray    FLONASE    16 mL    Spray 2 sprays into both nostrils daily    Mild intermittent asthma, uncomplicated       lisinopril 10 MG tablet    PRINIVIL/ZESTRIL    90 tablet    Take 1 tablet (10 mg) by mouth daily    Hypertension goal BP (blood pressure) < 140/90       MULTIVITAMIN TABS   OR      1 DAY        omega-3 fatty acids 1200 MG capsule     180 capsule    Take 1 capsule by mouth daily    Routine general medical examination at a health care facility       vitamin D 2000 UNITS tablet     100 tablet    Take 2,000 Units by mouth daily    Vitamin D deficiency disease

## 2017-09-26 NOTE — PROGRESS NOTES
SUBJECTIVE:                                                   Mary Kate Pacheco is a 57 year old female who presents to clinic today for the following health issue(s):  Patient presents with:  Urinary Problem: Here to discuss Stress Incontinence      Additional information: referred by Dr. Cuevas- [facility] [place]     HPI:  In 2012 the patient had a retropubic sling.  Presently year ago she noted some discharge and spotting.  It was noted that there was erosion of the sling on the patient's left side.  She was started on estrogen vaginal cream.  She now complains of discharge with some bright red bleeding at times associated with a mucousy discharge.  She has minimal stress incontinence.  She does complain of urgency and urge incontinence.    No LMP recorded. Patient has had a hysterectomy..   Patient is sexually active, No obstetric history on file..  Using hysterectomy for contraception.    reports that she has never smoked. She has never used smokeless tobacco.      STD testing offered?  Declined    Health maintenance updated:  yes    Today's PHQ-2 Score:   PHQ-2 ( 1999 [employer]) 5/8/2017   Q1: Little interest or pleasure in doing things 0   Q2: Feeling down, depressed or hopeless 0   PHQ-2 Score 0   Q1: Little interest or pleasure in doing things -   Q2: Feeling down, depressed or hopeless -   PHQ-2 Score -     Today's PHQ-9 Score:   PHQ-9 SCORE 9/26/2017   Total Score 0     Today's TARA-7 Score:   TARA-7 SCORE 9/26/2017   Total Score 0       Problem list and histories reviewed & adjusted, as indicated.  Additional history: as documented.    Patient Active Problem List   Diagnosis     Allergic rhinitis     Premature menopause     iamLUMBAGO     Acute pancreatitis     Female stress incontinence     Mild intermittent asthma     Hyperlipidemia LDL goal <160     Obesity     Hypertension goal BP (blood pressure) < 140/90     Vitamin D deficiency disease     Past Surgical History:    Procedure Laterality Date     C LAMINEC/FACETECT/FORAMIN,LUMBAR 1 SEG  2004    L5-S1     C NONSPECIFIC PROCEDURE      HUAN & BSO     C NONSPECIFIC PROCEDURE      TL     C NONSPECIFIC PROCEDURE      breast reduction     C NONSPECIFIC PROCEDURE      ? kidney stones     C TOTAL ABDOM HYSTERECTOMY  1996     and BSO.  Benign reasons.      CHOLECYSTECTOMY, LAPOROSCOPIC  2007     SLING TRANSPUBO WITHOUT ANTERIOR COLPORRHAPHY  2012    Procedure:SLING TRANSPUBO WITHOUT ANTERIOR COLPORRHAPHY; Pubo Vaginal Sling; Surgeon:SWATI VIEYRA; Location:[facility] OR     SURGICAL HISTORY OF -   06    L5-S1 anterior and posterior fusion      Social History   Substance Use Topics     Smoking status: Never Smoker     Smokeless tobacco: Never Used     Alcohol use 0.0 oz/week     0 Standard drinks or equivalent per week      Comment: very rarely      Problem (# of Occurrences) Relation (Name,Age of Onset)    Alzheimer Disease (1) Paternal Grandmother:  at 93    C.A.D. (1) Maternal Grandmother:  83 MI and CVA    Cancer - colorectal (1) Maternal Grandmother: late 70's    Circulatory (1) Paternal Grandfather:  brain aneurysm in his 40's     Family History Negative (6) Mother, Sister, Sister, Son, Son, Daughter    HEART DISEASE (1) Father: pacemaker in his late 70's for bradycardia     Hypertension (1) Father: glaucoma    OSTEOPOROSIS (1) Maternal Grandmother    Thyroid Disease (1) Father: elevated thyroid            Current Outpatient Prescriptions   Medication Sig     fluticasone (FLONASE) 50 MCG/ACT spray Spray 2 sprays into both nostrils daily     lisinopril (PRINIVIL/ZESTRIL) 10 MG tablet Take 1 tablet (10 mg) by mouth daily     Cholecalciferol (VITAMIN D) 2000 UNITS tablet Take 2,000 Units by mouth daily     calcium-vitamin D (CALCIUM 600 + D) 600-400 MG-UNIT per tablet Take 1 tablet by mouth 2 times daily (Patient taking differently: Take 1 tablet by mouth daily )     fexofenadine (ALLEGRA) 180 MG  "tablet Take 1 tablet (180 mg) by mouth daily     omega-3 fatty acids (FISH OIL) 1200 MG capsule Take 1 capsule by mouth daily     ALEVE 220 MG OR TABS 1 TABLET EVERY 12 HOURS AS NEEDED     FLAX SEED OIL 1000 MG OR CAPS 1 tab qd     ASPIRIN 81 MG OR TABS 1 tab po QD (Once per day)     MULTIVITAMIN TABS   OR 1 DAY     albuterol (PROAIR HFA/PROVENTIL HFA/VENTOLIN HFA) 108 (90 BASE) MCG/ACT Inhaler Inhale 2 puffs into the lungs every 4 hours as needed for shortness of breath / dyspnea (Patient not taking: Reported on 9/26/2017)     No current facility-administered medications for this visit.      Allergies   Allergen Reactions     Dust Mites      itchy runny nose sneezing     Ibuprofen      cancer sores with high doses     Mold      itchy runny nose sneezing     Seasonal Allergies      itchy runny nose sneezing       ROS:  12 point review of systems negative other than symptoms noted below.  Genitourinary: Spotting, Urgency and Vaginal Discharge  Endocrine: Cold Intolerance, Decreased Libido and Loss of Hair    OBJECTIVE:     /76  Ht 5' 4.75\" (1.645 m)  Wt 161 lb (73 kg)  Breastfeeding? No  BMI 27 kg/m2  Body mass index is 27 kg/(m^2).    Exam:  Constitutional:  Appearance: Well nourished, well developed alert, in no acute distress  Chest:  Respiratory Effort:  Breathing unlabored  Cardiovascular: no edema  Gastrointestinal:  Abdominal Examination:  Abdomen nontender to palpation,   Lymphatic: Lymph Nodes:  No other lymphadenopathy present  Skin:General Inspection:  No rashes present, no lesions present, no areas of discoloration; Genitalia and Groin:  No rashes present, no lesions present, no areas of discoloration, no masses present.  Neurologic/Psychiatric:  Mental Status:  Oriented X3   Pelvic Exam:  External Genitalia:     Normal appearance for age, no discharge present, no tenderness present, no inflammatory lesions present, color normal  Vagina:     The sling is palpable underneath the urethra with " exposed mesh on the left side.  Surgery is slightly tender.  Bladder:     Nontender to palpation  Urethra:   Urethral Body:  Urethra palpation normal, urethra structural support normal   Urethral Meatus:  No erythema or lesions present  Cervix:     Surgically absent  Uterus:     Surgically absent  Adnexa:     Surgically absent  Perineum:     Perineum within normal limits, no evidence of trauma, no rashes or skin lesions present  Anus:     Anus within normal limits, no hemorrhoids present  Inguinal Lymph Nodes:     No lymphadenopathy present       In-Clinic Test Results:  No results found for this or any previous visit (from the past 24 hour(s)).    ASSESSMENT/PLAN:                                                        ICD-10-CM    1. Vaginal erosion due to surgical mesh, initial encounter (H) T83.711A    2. Urgency of urination R39.15    3. Urge incontinence N39.41            Plan: The patient will require a mesh revision with excision of the exposed mesh underneath the urethra.  After 3 months if necessary a repeat sling can be performed.  She'll continue or resume using her estrogen vaginal cream after the surgery.    Artemio Bhatti MD  [facility]    Amount of time needed for the procedure:  1 hour    Expected time off from work:  1 week  Surgeon:  Artemio Bhatti MD  Surgical Procedure:  Revision and removal of suburethral sling, cystoscopy  Preop Needed:  Yes with  PCP  Location for surgery to performed:   Surgery Outpatient  Anesthesia:  General     Allergies   Allergen Reactions     Dust Mites      itchy runny nose sneezing     Ibuprofen      cancer sores with high doses     Mold      itchy runny nose sneezing     Seasonal Allergies      itchy runny nose sneezing       DIAGNOSIS:  Mesh erosion of the vagina    Special Instructions:

## 2017-09-26 NOTE — TELEPHONE ENCOUNTER
Patient surgery scheduled on 10/16/2017 at 7:30am Check in 5:30am  Location for surgery to performed:   Surgery Outpatient  Scheduled by [place] 9/26/2017     Information Packet given :Yes: HANDED 9/26/2017    CPT codes given: Yes    79320        Consents signed? N/A  Rep Informed :N/A    PREOP DATE :  PRIMARY [facility] [place]  In Epic :Yes    On Spreadsheet :Yes    On Calendar EB  :No    In [place] Calendar RONALDO  :Yes    Assist NONE   Assist in Epic NA  Assist Notified as needed :No     Chelly Hearn  Surgery Scheduler      Amount of time needed for the procedure:  1 hour                                            Expected time off from work:  1 week  Surgeon:  Artemio Bhatti MD  Surgical Procedure:  Revision and removal of suburethral sling, cystoscopy  Preop Needed:  Yes with  PCP  Location for surgery to performed:   Surgery Outpatient  Anesthesia:  General            Allergies   Allergen Reactions     Dust Mites         itchy runny nose sneezing     Ibuprofen         cancer sores with high doses     Mold         itchy runny nose sneezing     Seasonal Allergies         itchy runny nose sneezing         DIAGNOSIS:  Mesh erosion of the vagina     Special Instructions:

## 2017-09-27 ASSESSMENT — ANXIETY QUESTIONNAIRES: GAD7 TOTAL SCORE: 0

## 2017-10-11 ENCOUNTER — OFFICE VISIT (OUTPATIENT)
Dept: FAMILY MEDICINE | Facility: CLINIC | Age: 57
End: 2017-10-11
Payer: COMMERCIAL

## 2017-10-11 VITALS
TEMPERATURE: 98.5 F | WEIGHT: 161 LBS | RESPIRATION RATE: 12 BRPM | OXYGEN SATURATION: 98 % | HEART RATE: 74 BPM | DIASTOLIC BLOOD PRESSURE: 70 MMHG | SYSTOLIC BLOOD PRESSURE: 120 MMHG | BODY MASS INDEX: 27 KG/M2

## 2017-10-11 DIAGNOSIS — Z01.818 PREOP GENERAL PHYSICAL EXAM: Primary | ICD-10-CM

## 2017-10-11 DIAGNOSIS — N39.3 FEMALE STRESS INCONTINENCE: ICD-10-CM

## 2017-10-11 LAB
ALBUMIN SERPL-MCNC: 3.9 G/DL (ref 3.4–5)
ALP SERPL-CCNC: 82 U/L (ref 40–150)
ALT SERPL W P-5'-P-CCNC: 20 U/L (ref 0–50)
ANION GAP SERPL CALCULATED.3IONS-SCNC: 9 MMOL/L (ref 3–14)
AST SERPL W P-5'-P-CCNC: 12 U/L (ref 0–45)
BASOPHILS # BLD AUTO: 0.1 10E9/L (ref 0–0.2)
BASOPHILS NFR BLD AUTO: 1 %
BILIRUB SERPL-MCNC: 0.4 MG/DL (ref 0.2–1.3)
BUN SERPL-MCNC: 22 MG/DL (ref 7–30)
CALCIUM SERPL-MCNC: 9.8 MG/DL (ref 8.5–10.1)
CHLORIDE SERPL-SCNC: 105 MMOL/L (ref 94–109)
CO2 SERPL-SCNC: 27 MMOL/L (ref 20–32)
CREAT SERPL-MCNC: 0.66 MG/DL (ref 0.52–1.04)
DIFFERENTIAL METHOD BLD: NORMAL
EOSINOPHIL # BLD AUTO: 0.3 10E9/L (ref 0–0.7)
EOSINOPHIL NFR BLD AUTO: 4.8 %
ERYTHROCYTE [DISTWIDTH] IN BLOOD BY AUTOMATED COUNT: 12.6 % (ref 10–15)
GFR SERPL CREATININE-BSD FRML MDRD: >90 ML/MIN/1.7M2
GLUCOSE SERPL-MCNC: 77 MG/DL (ref 70–99)
HCT VFR BLD AUTO: 40.6 % (ref 35–47)
HGB BLD-MCNC: 13.2 G/DL (ref 11.7–15.7)
LYMPHOCYTES # BLD AUTO: 2.1 10E9/L (ref 0.8–5.3)
LYMPHOCYTES NFR BLD AUTO: 36.2 %
MCH RBC QN AUTO: 29.7 PG (ref 26.5–33)
MCHC RBC AUTO-ENTMCNC: 32.5 G/DL (ref 31.5–36.5)
MCV RBC AUTO: 91 FL (ref 78–100)
MONOCYTES # BLD AUTO: 0.8 10E9/L (ref 0–1.3)
MONOCYTES NFR BLD AUTO: 12.9 %
NEUTROPHILS # BLD AUTO: 2.7 10E9/L (ref 1.6–8.3)
NEUTROPHILS NFR BLD AUTO: 45.1 %
PLATELET # BLD AUTO: 237 10E9/L (ref 150–450)
POTASSIUM SERPL-SCNC: 4 MMOL/L (ref 3.4–5.3)
PROT SERPL-MCNC: 7.6 G/DL (ref 6.8–8.8)
RBC # BLD AUTO: 4.44 10E12/L (ref 3.8–5.2)
SODIUM SERPL-SCNC: 141 MMOL/L (ref 133–144)
WBC # BLD AUTO: 5.9 10E9/L (ref 4–11)

## 2017-10-11 PROCEDURE — 99214 OFFICE O/P EST MOD 30 MIN: CPT | Performed by: NURSE PRACTITIONER

## 2017-10-11 PROCEDURE — 80053 COMPREHEN METABOLIC PANEL: CPT | Performed by: NURSE PRACTITIONER

## 2017-10-11 PROCEDURE — 93000 ELECTROCARDIOGRAM COMPLETE: CPT | Performed by: NURSE PRACTITIONER

## 2017-10-11 PROCEDURE — 85025 COMPLETE CBC W/AUTO DIFF WBC: CPT | Performed by: NURSE PRACTITIONER

## 2017-10-11 PROCEDURE — 36415 COLL VENOUS BLD VENIPUNCTURE: CPT | Performed by: NURSE PRACTITIONER

## 2017-10-11 RX ORDER — MULTIVITAMIN,THERAPEUTIC
1 TABLET ORAL DAILY
COMMUNITY

## 2017-10-11 RX ORDER — OMEGA-3-ACID ETHYL ESTERS 1 G/1
1 CAPSULE, LIQUID FILLED ORAL DAILY
COMMUNITY

## 2017-10-11 NOTE — PROGRESS NOTES
[facility]  [address] [address]  [phone]  Dept: [phone]    PRE-OP EVALUATION:  Today's date: 10/11/2017    Mary Kate Pacheco (: 1960) presents for pre-operative evaluation assessment as requested by Dr. Artemio Bhatti.  She requires evaluation and anesthesia risk assessment prior to undergoing surgery/procedure for treatment of vaginal erosion .  Proposed procedure: REVISION AND REMOVAL OF SUBURETHRAL SLING AND CYSTOSCOPY    Date of Surgery/ Procedure: 10/16/2017  Time of Surgery/ Procedure: 7:30 am  Hospital/Surgical Facility: [facility]  Primary Physician: Haase, Susan Rachele  Type of Anesthesia Anticipated: General    Patient has a Health Care Directive or Living Will:  NO    Preop Questions 10/8/2017   1.  Do you have a history of heart attack, stroke, stent, bypass or surgery on an artery in the head, neck, heart or legs? No   2.  Do you ever have any pain or discomfort in your chest? No   3.  Do you have a history of  Heart Failure? No   4.   Are you troubled by shortness of breath when:  walking on a level surface, or up a slight hill, or at night? No   5.  Do you currently have a cold, bronchitis or other respiratory infection? No   6.  Do you have a cough, shortness of breath, or wheezing? No   7.  Do you sometimes get pains in the calves of your legs when you walk? No   8. Do you or anyone in your family have previous history of blood clots? No   9.  Do you or does anyone in your family have a serious bleeding problem such as prolonged bleeding following surgeries or cuts? No   10. Have you ever had problems with anemia or been told to take iron pills? No   11. Have you had any abnormal blood loss such as black, tarry or bloody stools, or abnormal vaginal bleeding? No   12. Have you ever had a blood transfusion? YES - 10 years ago    13. Have you or any of your relatives ever had problems with anesthesia? YES -  vomiting and nausea    14. Do you have sleep apnea, excessive snoring or daytime drowsiness? No   15. Do you have any prosthetic heart valves? No   16. Do you have prosthetic joints? No   17. Is there any chance that you may be pregnant? No           HPI:                                                      Brief HPI related to upcoming procedure: Mary Kate Pacheco is a 57 year old female who presents today for pre-op for revision and removal of suburethral sling and cytoscopy. She is feeling well overall, denies URI symptoms.     Continues to have vaginal bleeding and discharge. No prior surgical complications, however has anesthesia side effects including nausea and vomiting prior.     HYPERTENSION - BP is controlled, 120/70. Patient has longstanding history of HTN , currently denies any symptoms referable to elevated blood pressure. Specifically denies chest pain, palpitations, dyspnea, orthopnea, PND or peripheral edema. Blood pressure readings have been in normal range. Current medication regimen is as listed below. Patient denies any side effects of medication.                                                                                                                                                                                                                                          .  ASTHMA - Patient has a longstanding history of mild intermittent Asthma . Patient has been doing well overall and continues on daily allegra and flonase, last used albuterol several months ago.    ACT score is 25                                                                                                                                           MEDICAL HISTORY:                                                    Patient Active Problem List    Diagnosis Date Noted     Vitamin D deficiency disease 10/02/2014     Priority: Medium     Hypertension goal BP (blood pressure) < 140/90 06/19/2013     Priority: Medium     Obesity  10/01/2011     Priority: Medium     Hyperlipidemia LDL goal <160 10/31/2010     Priority: Medium     The 10-year ASCVD risk score (Warrengaro KING Jr, et al., 2013) is: 2.9%    Values used to calculate the score:      Age: 56 years      Sex: Female      Is Non- : No      Diabetic: No      Tobacco smoker: No      Systolic Blood Pressure: 120 mmHg      Is BP treated: Yes      HDL Cholesterol: 70 mg/dL      Total Cholesterol: 223 mg/dL       Mild intermittent asthma      Priority: Medium     trigger spring/fall allergies       Female stress incontinence 09/27/2007     Priority: Medium     Acute pancreatitis 08/16/2007     Priority: Medium     iamLUMBAGO 09/08/2005     Priority: Medium     Allergic rhinitis 06/13/2003     Priority: Medium     Problem list name updated by automated process. Provider to review       Premature menopause 06/13/2003     Priority: Medium     Surgical menopause benign reasons age 35        Past Medical History:   Diagnosis Date     Acute pancreatitis 7/2006     Allergic rhinitis, cause unspecified      Asthma      Cholecystitis, unspecified 7/2006     Hypertension      Mild intermittent asthma     trigger spring/fall allergies     Other and unspecified disc disorder of lumbar region      PONV (postoperative nausea and vomiting)      Symptomatic states associated with artificial menopause      Past Surgical History:   Procedure Laterality Date     C LAMINEC/FACETECT/FORAMIN,LUMBAR 1 SEG  2004    L5-S1     C NONSPECIFIC PROCEDURE  6/96    HUAN & BSO     C NONSPECIFIC PROCEDURE  1990    TL     C NONSPECIFIC PROCEDURE  1988    breast reduction     C NONSPECIFIC PROCEDURE  1997    ? kidney stones     C TOTAL ABDOM HYSTERECTOMY  6/1996     and BSO.  Benign reasons.      CHOLECYSTECTOMY, LAPOROSCOPIC  7/2007     SLING TRANSPUBO WITHOUT ANTERIOR COLPORRHAPHY  6/4/2012    Procedure:SLING TRANSPUBO WITHOUT ANTERIOR COLPORRHAPHY; Pubo Vaginal Sling; Surgeon:SWATI VIEYRA; Location:  OR     SURGICAL HISTORY OF - 1-25-06    L5-S1 anterior and posterior fusion     Current Outpatient Prescriptions   Medication Sig Dispense Refill     fluticasone (FLONASE) 50 MCG/ACT spray Spray 2 sprays into both nostrils daily 16 mL 11     albuterol (PROAIR HFA/PROVENTIL HFA/VENTOLIN HFA) 108 (90 BASE) MCG/ACT Inhaler Inhale 2 puffs into the lungs every 4 hours as needed for shortness of breath / dyspnea (Patient not taking: Reported on 9/26/2017) 1 Inhaler 11     lisinopril (PRINIVIL/ZESTRIL) 10 MG tablet Take 1 tablet (10 mg) by mouth daily 90 tablet 3     Cholecalciferol (VITAMIN D) 2000 UNITS tablet Take 2,000 Units by mouth daily 100 tablet 3     calcium-vitamin D (CALCIUM 600 + D) 600-400 MG-UNIT per tablet Take 1 tablet by mouth 2 times daily (Patient taking differently: Take 1 tablet by mouth daily ) 100 tablet 3     fexofenadine (ALLEGRA) 180 MG tablet Take 1 tablet (180 mg) by mouth daily 90 tablet 3     omega-3 fatty acids (FISH OIL) 1200 MG capsule Take 1 capsule by mouth daily 180 capsule 12     ALEVE 220 MG OR TABS 1 TABLET EVERY 12 HOURS AS NEEDED       FLAX SEED OIL 1000 MG OR CAPS 1 tab qd       ASPIRIN 81 MG OR TABS 1 tab po QD (Once per day)       MULTIVITAMIN TABS   OR 1 DAY       OTC products: none    Allergies   Allergen Reactions     Dust Mites      itchy runny nose sneezing     Ibuprofen      cancer sores with high doses     Mold      itchy runny nose sneezing     Seasonal Allergies      itchy runny nose sneezing      Latex Allergy: NO    Social History   Substance Use Topics     Smoking status: Never Smoker     Smokeless tobacco: Never Used     Alcohol use 0.0 oz/week     0 Standard drinks or equivalent per week      Comment: very rarely     History   Drug Use No       REVIEW OF SYSTEMS:                                                    C: NEGATIVE for fever, chills, change in weight  I: NEGATIVE for worrisome rashes, moles or lesions  E: NEGATIVE for vision changes or irritation  E/M:  NEGATIVE for ear, mouth and throat problems  R: NEGATIVE for significant cough or SOB  CV: NEGATIVE for chest pain, palpitations or peripheral edema  GI: NEGATIVE for nausea, abdominal pain, heartburn, or change in bowel habits  : NEGATIVE for frequency, dysuria, or hematuria. Abnormal vaginal bleeding  M: NEGATIVE for significant arthralgias or myalgia  N: NEGATIVE for weakness, dizziness or paresthesias  E: NEGATIVE for temperature intolerance, skin/hair changes  H: NEGATIVE for bleeding problems  P: NEGATIVE for changes in mood or affect    This document serves as a record of the services and decisions personally performed and made by Susan Haase, CNP. It was created on her behalf by Jessa Barrett, a trained medical scribe. The creation of this document is based on the provider's statements to the medical scribe.  Jessa Barrett 8:03 AM October 11, 2017    EXAM:                                                    /70 (BP Location: Left arm, Patient Position: Chair, Cuff Size: Adult Regular)  Pulse 74  Temp 98.5  F (36.9  C) (Oral)  Resp 12  Wt 73 kg (161 lb)  SpO2 98%  BMI 27 kg/m2    GENERAL APPEARANCE: healthy, alert and no distress     EYES: EOMI, PERRL     HENT: ear canals and TM's normal and nose and mouth without ulcers or lesions     NECK: no adenopathy, no asymmetry, masses, or scars and thyroid normal to palpation     RESP: lungs clear to auscultation - no rales, rhonchi or wheezes     CV: regular rates and rhythm, normal S1 S2, no S3 or S4 and no murmur, click or rub     ABDOMEN:  soft, nontender, no HSM or masses and bowel sounds normal     MS: extremities normal- no gross deformities noted, no evidence of inflammation in joints, FROM in all extremities.     SKIN: no suspicious lesions or rashes     NEURO: Normal strength and tone, sensory exam grossly normal, mentation intact and speech normal     PSYCH: mentation appears normal. and affect normal/bright     LYMPHATICS: No cervical, or  supraclavicular nodes    DIAGNOSTICS:                                                    EKG: appears normal, NSR, Normal Sinus Rhythm, normal axis, normal intervals, no acute ST/T changes c/w ischemia, no LVH by voltage criteria, unchanged from previous tracings  HGB:  13.2    IMPRESSION:                                                    Reason for surgery/procedure: REVISION AND REMOVAL OF SUBURETHRAL SLING AND CYSTOSCOPY  Diagnosis/reason for consult: Pre - Op     The proposed surgical procedure is considered INTERMEDIATE risk.    REVISED CARDIAC RISK INDEX  The patient has the following serious cardiovascular risks for perioperative complications such as (MI, PE, VFib and 3  AV Block):  No serious cardiac risks  INTERPRETATION: 0 risks: Class I (very low risk - 0.4% complication rate)    The patient has the following additional risks for perioperative complications:  No identified additional risks    No diagnosis found.    RECOMMENDATIONS:                                                    Mary Kate was seen today for pre-op exam.    Diagnoses and all orders for this visit:    Preop general physical exam  -     CBC with platelets differential  -     Comprehensive metabolic panel  -     EKG 12-lead complete w/read - Clinics    Female stress incontinence    --ACE Inhibitor or Angiotensin Receptor Blocker (ARB) Use  Ace inhibitor or Angiotensin Receptor Blocker (ARB) and should HOLD this medication for the 24 hours prior to surgery.    Approval given to proceed with proposed procedure, without further diagnostic evaluation  Is aware of NPO orders and need to refrain from taking fish oil, NSAIDS, Aspirin prior to surgery.     The information in this document, created by the medical scribe for me, accurately reflects the services I personally performed and the decisions made by me. I have reviewed and approved this document for accuracy prior to leaving the patient care area.  October 11, 2017 8:10 AM  Signed  Electronically by: Susan Haase, APRN CNP    Copy of this evaluation report is provided to requesting physician.    [place] Preop Guidelines

## 2017-10-11 NOTE — MR AVS SNAPSHOT
After Visit Summary   10/11/2017    Mary Kate Pacheco    MRN: [id number]           Patient Information     Date Of Birth          1960        Visit Information        Provider Department      10/11/2017 8:00 AM Haase, Susan Rachele, APRN CNP [facility]        Today's Diagnoses     Preop general physical exam    -  1      Care Instructions      Before Your Surgery      Call your surgeon if there is any change in your health. This includes signs of a cold or flu (such as a sore throat, runny nose, cough, rash or fever).    Do not smoke, drink alcohol or take over the counter medicine (unless your surgeon or primary care doctor tells you to) for the 24 hours before and after surgery.    If you take prescribed drugs: Follow your doctor s orders about which medicines to take and which to stop until after surgery.    Eating and drinking prior to surgery: follow the instructions from your surgeon    Take a shower or bath the night before surgery. Use the soap your surgeon gave you to gently clean your skin. If you do not have soap from your surgeon, use your regular soap. Do not shave or scrub the surgery site.  Wear clean pajamas and have clean sheets on your bed.           Follow-ups after your visit        Follow-up notes from your care team     Return if symptoms worsen or fail to improve.      Your next 10 appointments already scheduled     Oct 16, 2017   Procedure with Artemio Bhatti MD   [facility] (--)    [address]  [facility] [id number] [phone]              Who to contact     If you have questions or need follow up information about today's clinic visit or your schedule please contact [facility] directly at [phone].  Normal or non-critical lab and imaging results will be communicated to you by MyChart, letter or phone within 4 business days after the clinic has received the results. If you do not  hear from us within 7 days, please contact the clinic through [facility] or phone. If you have a critical or abnormal lab result, we will notify you by phone as soon as possible.  Submit refill requests through [facility] or call your pharmacy and they will forward the refill request to us. Please allow 3 business days for your refill to be completed.          Additional Information About Your Visit        [employer] Information     [facility] gives you secure access to your electronic health record. If you see a primary care provider, you can also send messages to your care team and make appointments. If you have questions, please call your primary care clinic.  If you do not have a primary care provider, please call [phone] and they will assist you.        Care EveryWhere ID     This is your Care EveryWhere ID. This could be used by other organizations to access your [place] medical records  [id number]        Your Vitals Were     Pulse Temperature Respirations Pulse Oximetry BMI (Body Mass Index)       74 98.5  F (36.9  C) (Oral) 12 98% 27 kg/m2        Blood Pressure from Last 3 Encounters:   10/11/17 120/70   09/26/17 148/76   05/08/17 120/70    Weight from Last 3 Encounters:   10/11/17 161 lb (73 kg)   09/26/17 161 lb (73 kg)   05/08/17 156 lb (70.8 kg)              We Performed the Following     CBC with platelets differential     Comprehensive metabolic panel     EKG 12-lead complete w/read - Clinics          Today's Medication Changes          These changes are accurate as of: 10/11/17  8:32 AM.  If you have any questions, ask your nurse or doctor.               These medicines have changed or have updated prescriptions.        Dose/Directions    calcium-vitamin D 600-400 MG-UNIT per tablet   Commonly known as:  calcium 600 + D   This may have changed:  when to take this   Used for:  Vitamin D deficiency disease        Dose:  1 tablet   Take 1 tablet by mouth 2 times daily   Quantity:  100 tablet   Refills:  3                 Primary Care Provider Office Phone # Fax #    Susan Rachele Haase, APRN [phone]       [address] [address] [facility] [address]        Equal Access to Services     LESTER KING : Hadii aad ku hadannabellao Sokayali, waaxda luqadaha, qaybta kaalmada adeednayada, iveth julien laMadelinemoon shelby. So [facility] [phone].    ATENCIÓN: Si habla español, tiene a steen disposición servicios gratuitos de asistencia lingüística. Llame al [phone].    We comply with applicable federal civil rights laws and [place] laws. We do not discriminate on the basis of race, color, national origin, age, disability, sex, sexual orientation, or gender identity.            Thank you!     Thank you for choosing [facility]  for your care. Our goal is always to provide you with excellent care. Hearing back from our patients is one way we can continue to improve our services. Please take a few minutes to complete the written survey that you may receive in the mail after your visit with us. Thank you!             Your Updated Medication List - Protect others around you: Learn how to safely use, store and throw away your medicines at www.disposemymeds.org.          This list is accurate as of: 10/11/17  8:32 AM.  Always use your most recent med list.                   Brand Name Dispense Instructions for use Diagnosis    albuterol 108 (90 BASE) MCG/ACT Inhaler    PROAIR HFA/PROVENTIL HFA/VENTOLIN HFA    1 Inhaler    Inhale 2 puffs into the lungs every 4 hours as needed for shortness of breath / dyspnea    Mild intermittent asthma, uncomplicated       ALEVE 220 MG tablet   Generic drug:  naproxen sodium      1 TABLET EVERY 12 HOURS AS NEEDED    Thoracic or lumbosacral neuritis or radiculitis, unspecified       aspirin 81 MG tablet      1 tab po QD (Once per day)        BIOTIN PO           calcium-vitamin D 600-400 MG-UNIT per tablet    calcium 600 + D    100 tablet    Take 1 tablet by mouth 2  times daily    Vitamin D deficiency disease       fexofenadine 180 MG tablet    ALLEGRA    90 tablet    Take 1 tablet (180 mg) by mouth daily    Allergic rhinitis, cause unspecified       flax seed oil 1000 MG capsule      1 tab qd        fluticasone 50 MCG/ACT spray    FLONASE    16 mL    Spray 2 sprays into both nostrils daily    Mild intermittent asthma, uncomplicated       lisinopril 10 MG tablet    PRINIVIL/ZESTRIL    90 tablet    Take 1 tablet (10 mg) by mouth daily    Hypertension goal BP (blood pressure) < 140/90       MULTIVITAMIN TABS   OR      1 DAY        omega-3 fatty acids 1200 MG capsule     180 capsule    Take 1 capsule by mouth daily    Routine general medical examination at a health care facility       vitamin D 2000 UNITS tablet     100 tablet    Take 2,000 Units by mouth daily    Vitamin D deficiency disease

## 2017-10-11 NOTE — PROGRESS NOTES
Marcin Hartmann,  Your CBC (checks for anemia and infection) was normal.  I also had another provider review the EKG and all looks good for surgery!  Sincerely,     Susan Haase, CNP

## 2017-10-11 NOTE — NURSING NOTE
"Chief Complaint   Patient presents with     Pre-Op Exam       Initial /70 (BP Location: Left arm, Patient Position: Chair, Cuff Size: Adult Regular)  Pulse 74  Temp 98.5  F (36.9  C) (Oral)  Resp 12  Wt 161 lb (73 kg)  SpO2 98%  BMI 27 kg/m2 Estimated body mass index is 27 kg/(m^2) as calculated from the following:    Height as of 9/26/17: 5' 4.75\" (1.645 m).    Weight as of this encounter: 161 lb (73 kg).  Medication Reconciliation: complete   Bella Dickens CMA      "

## 2017-10-12 ASSESSMENT — ASTHMA QUESTIONNAIRES: ACT_TOTALSCORE: 25

## 2017-10-12 NOTE — H&P (VIEW-ONLY)
[facility]  [address] [address] [facility] [address]  [phone]  Dept: [phone]    PRE-OP EVALUATION:  Today's date: 10/11/2017    Mary Kate Pacheco (: 1960) presents for pre-operative evaluation assessment as requested by Dr. Artemio Bhatti.  She requires evaluation and anesthesia risk assessment prior to undergoing surgery/procedure for treatment of vaginal erosion .  Proposed procedure: REVISION AND REMOVAL OF SUBURETHRAL SLING AND CYSTOSCOPY    Date of Surgery/ Procedure: 10/16/2017  Time of Surgery/ Procedure: 7:30 am  Hospital/Surgical Facility: [facility]  Primary Physician: Haase, Susan Rachele  Type of Anesthesia Anticipated: General    Patient has a Health Care Directive or Living Will:  NO    Preop Questions 10/8/2017   1.  Do you have a history of heart attack, stroke, stent, bypass or surgery on an artery in the head, neck, heart or legs? No   2.  Do you ever have any pain or discomfort in your chest? No   3.  Do you have a history of  Heart Failure? No   4.   Are you troubled by shortness of breath when:  walking on a level surface, or up a slight hill, or at night? No   5.  Do you currently have a cold, bronchitis or other respiratory infection? No   6.  Do you have a cough, shortness of breath, or wheezing? No   7.  Do you sometimes get pains in the calves of your legs when you walk? No   8. Do you or anyone in your family have previous history of blood clots? No   9.  Do you or does anyone in your family have a serious bleeding problem such as prolonged bleeding following surgeries or cuts? No   10. Have you ever had problems with anemia or been told to take iron pills? No   11. Have you had any abnormal blood loss such as black, tarry or bloody stools, or abnormal vaginal bleeding? No   12. Have you ever had a blood transfusion? YES - 10 years ago    13. Have you or any of your relatives ever had problems with anesthesia? YES -  vomiting and nausea    14. Do you have sleep apnea, excessive snoring or daytime drowsiness? No   15. Do you have any prosthetic heart valves? No   16. Do you have prosthetic joints? No   17. Is there any chance that you may be pregnant? No           HPI:                                                      Brief HPI related to upcoming procedure: Mary Kate Pacheco is a 57 year old female who presents today for pre-op for revision and removal of suburethral sling and cytoscopy. She is feeling well overall, denies URI symptoms.     Continues to have vaginal bleeding and discharge. No prior surgical complications, however has anesthesia side effects including nausea and vomiting prior.     HYPERTENSION - BP is controlled, 120/70. Patient has longstanding history of HTN , currently denies any symptoms referable to elevated blood pressure. Specifically denies chest pain, palpitations, dyspnea, orthopnea, PND or peripheral edema. Blood pressure readings have been in normal range. Current medication regimen is as listed below. Patient denies any side effects of medication.                                                                                                                                                                                                                                          .  ASTHMA - Patient has a longstanding history of mild intermittent Asthma . Patient has been doing well overall and continues on daily allegra and flonase, last used albuterol several months ago.    ACT score is 25                                                                                                                                           MEDICAL HISTORY:                                                    Patient Active Problem List    Diagnosis Date Noted     Vitamin D deficiency disease 10/02/2014     Priority: Medium     Hypertension goal BP (blood pressure) < 140/90 06/19/2013     Priority: Medium     Obesity  10/01/2011     Priority: Medium     Hyperlipidemia LDL goal <160 10/31/2010     Priority: Medium     The 10-year ASCVD risk score (Warrengaro KING Jr, et al., 2013) is: 2.9%    Values used to calculate the score:      Age: 56 years      Sex: Female      Is Non- : No      Diabetic: No      Tobacco smoker: No      Systolic Blood Pressure: 120 mmHg      Is BP treated: Yes      HDL Cholesterol: 70 mg/dL      Total Cholesterol: 223 mg/dL       Mild intermittent asthma      Priority: Medium     trigger spring/fall allergies       Female stress incontinence 09/27/2007     Priority: Medium     Acute pancreatitis 08/16/2007     Priority: Medium     iamLUMBAGO 09/08/2005     Priority: Medium     Allergic rhinitis 06/13/2003     Priority: Medium     Problem list name updated by automated process. Provider to review       Premature menopause 06/13/2003     Priority: Medium     Surgical menopause benign reasons age 35        Past Medical History:   Diagnosis Date     Acute pancreatitis 7/2006     Allergic rhinitis, cause unspecified      Asthma      Cholecystitis, unspecified 7/2006     Hypertension      Mild intermittent asthma     trigger spring/fall allergies     Other and unspecified disc disorder of lumbar region      PONV (postoperative nausea and vomiting)      Symptomatic states associated with artificial menopause      Past Surgical History:   Procedure Laterality Date     C LAMINEC/FACETECT/FORAMIN,LUMBAR 1 SEG  2004    L5-S1     C NONSPECIFIC PROCEDURE  6/96    HUAN & BSO     C NONSPECIFIC PROCEDURE  1990    TL     C NONSPECIFIC PROCEDURE  1988    breast reduction     C NONSPECIFIC PROCEDURE  1997    ? kidney stones     C TOTAL ABDOM HYSTERECTOMY  6/1996     and BSO.  Benign reasons.      CHOLECYSTECTOMY, LAPOROSCOPIC  7/2007     SLING TRANSPUBO WITHOUT ANTERIOR COLPORRHAPHY  6/4/2012    Procedure:SLING TRANSPUBO WITHOUT ANTERIOR COLPORRHAPHY; Pubo Vaginal Sling; Surgeon:SWATI VIERYA; Location:  OR     SURGICAL HISTORY OF - 1-25-06    L5-S1 anterior and posterior fusion     Current Outpatient Prescriptions   Medication Sig Dispense Refill     fluticasone (FLONASE) 50 MCG/ACT spray Spray 2 sprays into both nostrils daily 16 mL 11     albuterol (PROAIR HFA/PROVENTIL HFA/VENTOLIN HFA) 108 (90 BASE) MCG/ACT Inhaler Inhale 2 puffs into the lungs every 4 hours as needed for shortness of breath / dyspnea (Patient not taking: Reported on 9/26/2017) 1 Inhaler 11     lisinopril (PRINIVIL/ZESTRIL) 10 MG tablet Take 1 tablet (10 mg) by mouth daily 90 tablet 3     Cholecalciferol (VITAMIN D) 2000 UNITS tablet Take 2,000 Units by mouth daily 100 tablet 3     calcium-vitamin D (CALCIUM 600 + D) 600-400 MG-UNIT per tablet Take 1 tablet by mouth 2 times daily (Patient taking differently: Take 1 tablet by mouth daily ) 100 tablet 3     fexofenadine (ALLEGRA) 180 MG tablet Take 1 tablet (180 mg) by mouth daily 90 tablet 3     omega-3 fatty acids (FISH OIL) 1200 MG capsule Take 1 capsule by mouth daily 180 capsule 12     ALEVE 220 MG OR TABS 1 TABLET EVERY 12 HOURS AS NEEDED       FLAX SEED OIL 1000 MG OR CAPS 1 tab qd       ASPIRIN 81 MG OR TABS 1 tab po QD (Once per day)       MULTIVITAMIN TABS   OR 1 DAY       OTC products: none    Allergies   Allergen Reactions     Dust Mites      itchy runny nose sneezing     Ibuprofen      cancer sores with high doses     Mold      itchy runny nose sneezing     Seasonal Allergies      itchy runny nose sneezing      Latex Allergy: NO    Social History   Substance Use Topics     Smoking status: Never Smoker     Smokeless tobacco: Never Used     Alcohol use 0.0 oz/week     0 Standard drinks or equivalent per week      Comment: very rarely     History   Drug Use No       REVIEW OF SYSTEMS:                                                    C: NEGATIVE for fever, chills, change in weight  I: NEGATIVE for worrisome rashes, moles or lesions  E: NEGATIVE for vision changes or irritation  E/M:  NEGATIVE for ear, mouth and throat problems  R: NEGATIVE for significant cough or SOB  CV: NEGATIVE for chest pain, palpitations or peripheral edema  GI: NEGATIVE for nausea, abdominal pain, heartburn, or change in bowel habits  : NEGATIVE for frequency, dysuria, or hematuria. Abnormal vaginal bleeding  M: NEGATIVE for significant arthralgias or myalgia  N: NEGATIVE for weakness, dizziness or paresthesias  E: NEGATIVE for temperature intolerance, skin/hair changes  H: NEGATIVE for bleeding problems  P: NEGATIVE for changes in mood or affect    This document serves as a record of the services and decisions personally performed and made by Susan Haase, CNP. It was created on her behalf by Jessa Barrett, a trained medical scribe. The creation of this document is based on the provider's statements to the medical scribe.  Jessa Barrett 8:03 AM October 11, 2017    EXAM:                                                    /70 (BP Location: Left arm, Patient Position: Chair, Cuff Size: Adult Regular)  Pulse 74  Temp 98.5  F (36.9  C) (Oral)  Resp 12  Wt 73 kg (161 lb)  SpO2 98%  BMI 27 kg/m2    GENERAL APPEARANCE: healthy, alert and no distress     EYES: EOMI, PERRL     HENT: ear canals and TM's normal and nose and mouth without ulcers or lesions     NECK: no adenopathy, no asymmetry, masses, or scars and thyroid normal to palpation     RESP: lungs clear to auscultation - no rales, rhonchi or wheezes     CV: regular rates and rhythm, normal S1 S2, no S3 or S4 and no murmur, click or rub     ABDOMEN:  soft, nontender, no HSM or masses and bowel sounds normal     MS: extremities normal- no gross deformities noted, no evidence of inflammation in joints, FROM in all extremities.     SKIN: no suspicious lesions or rashes     NEURO: Normal strength and tone, sensory exam grossly normal, mentation intact and speech normal     PSYCH: mentation appears normal. and affect normal/bright     LYMPHATICS: No cervical, or  supraclavicular nodes    DIAGNOSTICS:                                                    EKG: appears normal, NSR, Normal Sinus Rhythm, normal axis, normal intervals, no acute ST/T changes c/w ischemia, no LVH by voltage criteria, unchanged from previous tracings  HGB:  13.2    IMPRESSION:                                                    Reason for surgery/procedure: REVISION AND REMOVAL OF SUBURETHRAL SLING AND CYSTOSCOPY  Diagnosis/reason for consult: Pre - Op     The proposed surgical procedure is considered INTERMEDIATE risk.    REVISED CARDIAC RISK INDEX  The patient has the following serious cardiovascular risks for perioperative complications such as (MI, PE, VFib and 3  AV Block):  No serious cardiac risks  INTERPRETATION: 0 risks: Class I (very low risk - 0.4% complication rate)    The patient has the following additional risks for perioperative complications:  No identified additional risks    No diagnosis found.    RECOMMENDATIONS:                                                    Mary Kate was seen today for pre-op exam.    Diagnoses and all orders for this visit:    Preop general physical exam  -     CBC with platelets differential  -     Comprehensive metabolic panel  -     EKG 12-lead complete w/read - Clinics    Female stress incontinence    --ACE Inhibitor or Angiotensin Receptor Blocker (ARB) Use  Ace inhibitor or Angiotensin Receptor Blocker (ARB) and should HOLD this medication for the 24 hours prior to surgery.    Approval given to proceed with proposed procedure, without further diagnostic evaluation  Is aware of NPO orders and need to refrain from taking fish oil, NSAIDS, Aspirin prior to surgery.     The information in this document, created by the medical scribe for me, accurately reflects the services I personally performed and the decisions made by me. I have reviewed and approved this document for accuracy prior to leaving the patient care area.  October 11, 2017 8:10 AM  Signed  Electronically by: Susan Haase, APRN CNP    Copy of this evaluation report is provided to requesting physician.    [facility] Preop Guidelines

## 2017-10-12 NOTE — PROGRESS NOTES
Marcin Hartmann,  Your electrolytes (checks glucose, kidney/liver function) were normal.  Sincerely,    Susan Haase, CNP

## 2017-10-16 ENCOUNTER — ANESTHESIA (OUTPATIENT)
Dept: SURGERY | Facility: CLINIC | Age: 57
End: 2017-10-16
Payer: COMMERCIAL

## 2017-10-16 ENCOUNTER — SURGERY (OUTPATIENT)
Age: 57
End: 2017-10-16
Payer: COMMERCIAL

## 2017-10-16 ENCOUNTER — HOSPITAL ENCOUNTER (OUTPATIENT)
Facility: CLINIC | Age: 57
Discharge: HOME | End: 2017-10-16
Attending: OBSTETRICS & GYNECOLOGY | Admitting: OBSTETRICS & GYNECOLOGY
Payer: COMMERCIAL

## 2017-10-16 ENCOUNTER — ANESTHESIA EVENT (OUTPATIENT)
Dept: SURGERY | Facility: CLINIC | Age: 57
End: 2017-10-16
Payer: COMMERCIAL

## 2017-10-16 VITALS
WEIGHT: 161 LBS | BODY MASS INDEX: 27 KG/M2 | SYSTOLIC BLOOD PRESSURE: 127 MMHG | DIASTOLIC BLOOD PRESSURE: 78 MMHG | TEMPERATURE: 97.3 F | OXYGEN SATURATION: 99 % | RESPIRATION RATE: 16 BRPM

## 2017-10-16 DIAGNOSIS — N30.00 ACUTE CYSTITIS WITHOUT HEMATURIA: ICD-10-CM

## 2017-10-16 DIAGNOSIS — G89.18 ACUTE POST-OPERATIVE PAIN: Primary | ICD-10-CM

## 2017-10-16 PROCEDURE — 71000013 ZZH RECOVERY PHASE 1 LEVEL 1 EA ADDTL HR: Performed by: OBSTETRICS & GYNECOLOGY

## 2017-10-16 PROCEDURE — 27210794 ZZH OR GENERAL SUPPLY STERILE: Performed by: OBSTETRICS & GYNECOLOGY

## 2017-10-16 PROCEDURE — 71000012 ZZH RECOVERY PHASE 1 LEVEL 1 FIRST HR: Performed by: OBSTETRICS & GYNECOLOGY

## 2017-10-16 PROCEDURE — 25000128 H RX IP 250 OP 636: Performed by: NURSE ANESTHETIST, CERTIFIED REGISTERED

## 2017-10-16 PROCEDURE — 88300 SURGICAL PATH GROSS: CPT | Mod: 26 | Performed by: OBSTETRICS & GYNECOLOGY

## 2017-10-16 PROCEDURE — 40000170 ZZH STATISTIC PRE-PROCEDURE ASSESSMENT II: Performed by: OBSTETRICS & GYNECOLOGY

## 2017-10-16 PROCEDURE — 71000027 ZZH RECOVERY PHASE 2 EACH 15 MINS: Performed by: OBSTETRICS & GYNECOLOGY

## 2017-10-16 PROCEDURE — 37000009 ZZH ANESTHESIA TECHNICAL FEE, EACH ADDTL 15 MIN: Performed by: OBSTETRICS & GYNECOLOGY

## 2017-10-16 PROCEDURE — 57295 REVISE VAG GRAFT VIA VAGINA: CPT | Performed by: OBSTETRICS & GYNECOLOGY

## 2017-10-16 PROCEDURE — 36000058 ZZH SURGERY LEVEL 3 EA 15 ADDTL MIN: Performed by: OBSTETRICS & GYNECOLOGY

## 2017-10-16 PROCEDURE — 88300 SURGICAL PATH GROSS: CPT | Performed by: OBSTETRICS & GYNECOLOGY

## 2017-10-16 PROCEDURE — 37000008 ZZH ANESTHESIA TECHNICAL FEE, 1ST 30 MIN: Performed by: OBSTETRICS & GYNECOLOGY

## 2017-10-16 PROCEDURE — 25000128 H RX IP 250 OP 636: Performed by: OBSTETRICS & GYNECOLOGY

## 2017-10-16 PROCEDURE — 25000125 ZZHC RX 250: Performed by: NURSE ANESTHETIST, CERTIFIED REGISTERED

## 2017-10-16 PROCEDURE — 36000056 ZZH SURGERY LEVEL 3 1ST 30 MIN: Performed by: OBSTETRICS & GYNECOLOGY

## 2017-10-16 PROCEDURE — 25000125 ZZHC RX 250: Performed by: OBSTETRICS & GYNECOLOGY

## 2017-10-16 RX ORDER — NITROFURANTOIN 25; 75 MG/1; MG/1
100 CAPSULE ORAL 2 TIMES DAILY
Qty: 6 CAPSULE | Refills: 0 | Status: SHIPPED | OUTPATIENT
Start: 2017-10-16 | End: 2017-10-19

## 2017-10-16 RX ORDER — PROPOFOL 10 MG/ML
INJECTION, EMULSION INTRAVENOUS PRN
Status: DISCONTINUED | OUTPATIENT
Start: 2017-10-16 | End: 2017-10-16

## 2017-10-16 RX ORDER — ONDANSETRON 4 MG/1
4 TABLET, ORALLY DISINTEGRATING ORAL EVERY 30 MIN PRN
Status: DISCONTINUED | OUTPATIENT
Start: 2017-10-16 | End: 2017-10-16 | Stop reason: HOSPADM

## 2017-10-16 RX ORDER — SODIUM CHLORIDE, SODIUM LACTATE, POTASSIUM CHLORIDE, CALCIUM CHLORIDE 600; 310; 30; 20 MG/100ML; MG/100ML; MG/100ML; MG/100ML
INJECTION, SOLUTION INTRAVENOUS CONTINUOUS PRN
Status: DISCONTINUED | OUTPATIENT
Start: 2017-10-16 | End: 2017-10-16

## 2017-10-16 RX ORDER — ONDANSETRON 2 MG/ML
INJECTION INTRAMUSCULAR; INTRAVENOUS PRN
Status: DISCONTINUED | OUTPATIENT
Start: 2017-10-16 | End: 2017-10-16

## 2017-10-16 RX ORDER — PROPOFOL 10 MG/ML
INJECTION, EMULSION INTRAVENOUS CONTINUOUS PRN
Status: DISCONTINUED | OUTPATIENT
Start: 2017-10-16 | End: 2017-10-16

## 2017-10-16 RX ORDER — NALOXONE HYDROCHLORIDE 0.4 MG/ML
.1-.4 INJECTION, SOLUTION INTRAMUSCULAR; INTRAVENOUS; SUBCUTANEOUS
Status: DISCONTINUED | OUTPATIENT
Start: 2017-10-16 | End: 2017-10-16 | Stop reason: HOSPADM

## 2017-10-16 RX ORDER — ONDANSETRON 2 MG/ML
4 INJECTION INTRAMUSCULAR; INTRAVENOUS EVERY 30 MIN PRN
Status: DISCONTINUED | OUTPATIENT
Start: 2017-10-16 | End: 2017-10-16 | Stop reason: HOSPADM

## 2017-10-16 RX ORDER — EPINEPHRINE 1 MG/ML
INJECTION, SOLUTION INTRAMUSCULAR; SUBCUTANEOUS
Status: DISCONTINUED
Start: 2017-10-16 | End: 2017-10-16 | Stop reason: HOSPADM

## 2017-10-16 RX ORDER — HYDROCODONE BITARTRATE AND ACETAMINOPHEN 5; 325 MG/1; MG/1
1-2 TABLET ORAL EVERY 4 HOURS PRN
Qty: 12 TABLET | Refills: 0 | Status: SHIPPED | OUTPATIENT
Start: 2017-10-16 | End: 2017-11-01

## 2017-10-16 RX ORDER — BUPIVACAINE HYDROCHLORIDE AND EPINEPHRINE 2.5; 5 MG/ML; UG/ML
INJECTION, SOLUTION INFILTRATION; PERINEURAL PRN
Status: DISCONTINUED | OUTPATIENT
Start: 2017-10-16 | End: 2017-10-16 | Stop reason: HOSPADM

## 2017-10-16 RX ORDER — FENTANYL CITRATE 50 UG/ML
INJECTION, SOLUTION INTRAMUSCULAR; INTRAVENOUS PRN
Status: DISCONTINUED | OUTPATIENT
Start: 2017-10-16 | End: 2017-10-16

## 2017-10-16 RX ORDER — BUPIVACAINE HYDROCHLORIDE 2.5 MG/ML
INJECTION, SOLUTION EPIDURAL; INFILTRATION; INTRACAUDAL
Status: DISCONTINUED
Start: 2017-10-16 | End: 2017-10-16 | Stop reason: HOSPADM

## 2017-10-16 RX ORDER — SODIUM CHLORIDE, SODIUM LACTATE, POTASSIUM CHLORIDE, CALCIUM CHLORIDE 600; 310; 30; 20 MG/100ML; MG/100ML; MG/100ML; MG/100ML
INJECTION, SOLUTION INTRAVENOUS CONTINUOUS
Status: DISCONTINUED | OUTPATIENT
Start: 2017-10-16 | End: 2017-10-16 | Stop reason: HOSPADM

## 2017-10-16 RX ORDER — HYDROCODONE BITARTRATE AND ACETAMINOPHEN 5; 325 MG/1; MG/1
1-2 TABLET ORAL
Status: DISCONTINUED | OUTPATIENT
Start: 2017-10-16 | End: 2017-10-16 | Stop reason: HOSPADM

## 2017-10-16 RX ORDER — ACETAMINOPHEN 325 MG/1
650 TABLET ORAL
Status: DISCONTINUED | OUTPATIENT
Start: 2017-10-16 | End: 2017-10-16 | Stop reason: HOSPADM

## 2017-10-16 RX ORDER — CEFAZOLIN SODIUM 1 G/3ML
1 INJECTION, POWDER, FOR SOLUTION INTRAMUSCULAR; INTRAVENOUS SEE ADMIN INSTRUCTIONS
Status: DISCONTINUED | OUTPATIENT
Start: 2017-10-16 | End: 2017-10-16 | Stop reason: HOSPADM

## 2017-10-16 RX ORDER — MEPERIDINE HYDROCHLORIDE 25 MG/ML
12.5 INJECTION INTRAMUSCULAR; INTRAVENOUS; SUBCUTANEOUS
Status: DISCONTINUED | OUTPATIENT
Start: 2017-10-16 | End: 2017-10-16 | Stop reason: HOSPADM

## 2017-10-16 RX ORDER — HYDROMORPHONE HYDROCHLORIDE 1 MG/ML
.3-.5 INJECTION, SOLUTION INTRAMUSCULAR; INTRAVENOUS; SUBCUTANEOUS EVERY 10 MIN PRN
Status: DISCONTINUED | OUTPATIENT
Start: 2017-10-16 | End: 2017-10-16 | Stop reason: HOSPADM

## 2017-10-16 RX ORDER — LIDOCAINE HYDROCHLORIDE 20 MG/ML
INJECTION, SOLUTION INFILTRATION; PERINEURAL PRN
Status: DISCONTINUED | OUTPATIENT
Start: 2017-10-16 | End: 2017-10-16

## 2017-10-16 RX ORDER — FENTANYL CITRATE 50 UG/ML
25-50 INJECTION, SOLUTION INTRAMUSCULAR; INTRAVENOUS EVERY 5 MIN PRN
Status: DISCONTINUED | OUTPATIENT
Start: 2017-10-16 | End: 2017-10-16 | Stop reason: HOSPADM

## 2017-10-16 RX ORDER — FENTANYL CITRATE 50 UG/ML
25-50 INJECTION, SOLUTION INTRAMUSCULAR; INTRAVENOUS
Status: DISCONTINUED | OUTPATIENT
Start: 2017-10-16 | End: 2017-10-16 | Stop reason: HOSPADM

## 2017-10-16 RX ORDER — DEXAMETHASONE SODIUM PHOSPHATE 4 MG/ML
INJECTION, SOLUTION INTRA-ARTICULAR; INTRALESIONAL; INTRAMUSCULAR; INTRAVENOUS; SOFT TISSUE PRN
Status: DISCONTINUED | OUTPATIENT
Start: 2017-10-16 | End: 2017-10-16

## 2017-10-16 RX ORDER — CEFAZOLIN SODIUM 2 G/100ML
2 INJECTION, SOLUTION INTRAVENOUS
Status: COMPLETED | OUTPATIENT
Start: 2017-10-16 | End: 2017-10-16

## 2017-10-16 RX ADMIN — MIDAZOLAM HYDROCHLORIDE 2 MG: 1 INJECTION, SOLUTION INTRAMUSCULAR; INTRAVENOUS at 07:34

## 2017-10-16 RX ADMIN — PROPOFOL 200 MCG/KG/MIN: 10 INJECTION, EMULSION INTRAVENOUS at 07:35

## 2017-10-16 RX ADMIN — SODIUM CHLORIDE, POTASSIUM CHLORIDE, SODIUM LACTATE AND CALCIUM CHLORIDE: 600; 310; 30; 20 INJECTION, SOLUTION INTRAVENOUS at 07:34

## 2017-10-16 RX ADMIN — DEXAMETHASONE SODIUM PHOSPHATE 4 MG: 4 INJECTION, SOLUTION INTRA-ARTICULAR; INTRALESIONAL; INTRAMUSCULAR; INTRAVENOUS; SOFT TISSUE at 07:45

## 2017-10-16 RX ADMIN — BUPIVACAINE HYDROCHLORIDE AND EPINEPHRINE BITARTRATE 10 ML: 2.5; .005 INJECTION, SOLUTION EPIDURAL; INFILTRATION; INTRACAUDAL; PERINEURAL at 08:03

## 2017-10-16 RX ADMIN — ONDANSETRON 4 MG: 2 INJECTION INTRAMUSCULAR; INTRAVENOUS at 07:47

## 2017-10-16 RX ADMIN — FENTANYL CITRATE 25 MCG: 50 INJECTION, SOLUTION INTRAMUSCULAR; INTRAVENOUS at 07:48

## 2017-10-16 RX ADMIN — LIDOCAINE HYDROCHLORIDE 60 MG: 20 INJECTION, SOLUTION INFILTRATION; PERINEURAL at 07:35

## 2017-10-16 RX ADMIN — FENTANYL CITRATE 50 MCG: 50 INJECTION, SOLUTION INTRAMUSCULAR; INTRAVENOUS at 07:34

## 2017-10-16 RX ADMIN — FENTANYL CITRATE 25 MCG: 50 INJECTION, SOLUTION INTRAMUSCULAR; INTRAVENOUS at 07:53

## 2017-10-16 RX ADMIN — CEFAZOLIN SODIUM 2 G: 2 INJECTION, SOLUTION INTRAVENOUS at 07:40

## 2017-10-16 RX ADMIN — PROPOFOL 200 MG: 10 INJECTION, EMULSION INTRAVENOUS at 07:35

## 2017-10-16 NOTE — IP AVS SNAPSHOT
MRN:[id number]                      After Visit Summary   10/16/2017    Mary Kate Pacheco    MRN: [id number]           Thank you!     Thank you for choosing [facility] for your care. Our goal is always to provide you with excellent care. Hearing back from our patients is one way we can continue to improve our services. Please take a few minutes to complete the written survey that you may receive in the mail after you visit with us. Thank you!        Patient Information     Date Of Birth          1960        About your hospital stay     You were admitted on:  October 16, 2017 You last received care in the:  [facility] Same Day Surgery    You were discharged on:  October 16, 2017       Who to Call     For medical emergencies, please call 911.  For non-urgent questions about your medical care, please call your primary care provider or clinic, [phone]  For questions related to your surgery, please call your surgery clinic        Attending Provider     Provider Artemio Joshua MD OB/Gyn       Primary Care Provider Office Phone # Fax #    Chayo Rachele Haase, APRN [phone]      After Care Instructions     Discharge Instructions       Patient to arrange follow up appointment in 2  weeks                  Further instructions from your care team       Same Day Surgery Discharge Instructions for  Sedation and General Anesthesia       It's not unusual to feel dizzy, light-headed or faint for up to 24 hours after surgery or while taking pain medication.  If you have these symptoms: sit for a few minutes before standing and have someone assist you when you get up to walk or use the bathroom.      You should rest and relax for the next 24 hours. We recommend you make arrangements to have an adult stay with you for at least 24 hours after your discharge.  Avoid hazardous and strenuous activity.      DO NOT DRIVE any vehicle or operate mechanical equipment  for 24 hours following the end of your surgery.  Even though you may feel normal, your reactions may be affected by the medication you have received.      Do not drink alcoholic beverages for 24 hours following surgery.       Slowly progress to your regular diet as you feel able. It's not unusual to feel nauseated and/or vomit after receiving anesthesia.  If you develop these symptoms, drink clear liquids (apple juice, ginger ale, broth, 7-up, etc. ) until you feel better.  If your nausea and vomiting persists for 24 hours, please notify your surgeon.        All narcotic pain medications, along with inactivity and anesthesia, can cause constipation. Drinking plenty of liquids and increasing fiber intake will help.      For any questions of a medical nature, call your surgeon.      Do not make important decisions for 24 hours.      If you had general anesthesia, you may have a sore throat for a couple of days related to the breathing tube used during surgery.  You may use Cepacol lozenges to help with this discomfort.  If it worsens or if you develop a fever, contact your surgeon.       If you feel your pain is not well managed with the pain medications prescribed by your surgeon, please contact your surgeon's office to let them know so they can address your concerns.     Cystoscopy Discharge Instructions      Diet:    Return to the diet that you were on before the procedure, unless you are given specific diet instructions.    It is important to drink 6-8 glasses of fluids per day at home - at least 3-4 glasses should be water.    Activity:    Walk short distances and increase as your strength allows.    You may climb stairs.    Do not do strenuous exercise or heavy lifting until approved by surgeon.    Do not drive while taking narcotic pain medications.    Bathing:    You may take a shower.    Call your physician if these signs/symptoms are present:    Pain that is not relieved by a short rest or ordered pain  medications.    Temperature at or above 101.0 F or chills.    Inability or difficulty urinating.  Excessive blood in urine.    Any questions or concerns.      **If you have questions or concerns about your procedure,  call Dr. Bhatti at [phone]**      Pending Results     Date and Time Order Name Status Description    10/16/2017 0758 Surgical pathology exam In process             Admission Information     Date & Time Provider Department Dept. Phone    10/16/2017 Artemio Bhatti MD [facility] Same Day Surgery [phone]      Your Vitals Were     Blood Pressure Temperature Respirations Weight Pulse Oximetry BMI (Body Mass Index)    128/83 97.1  F (36.2  C) (Temporal) 19 73 kg (161 lb) 97% 27 kg/m2      MyChart Information     [employer] gives you secure access to your electronic health record. If you see a primary care provider, you can also send messages to your care team and make appointments. If you have questions, please call your primary care clinic.  If you do not have a primary care provider, please call [phone] and they will assist you.        Care EveryWhere ID     This is your Care EveryWhere ID. This could be used by other organizations to access your [facility] medical records  [id number]        Equal Access to Services     LESTER KING : Margareth Abel, watesfaye marques, qameghannta kaalmada joy, iveth shelby. So [facility] [phone].    ATENCIÓN: Si habla español, tiene a steen disposición servicios gratuitos de asistencia lingüística. Llame al [phone].    We comply with applicable federal civil rights laws and [place] laws. We do not discriminate on the basis of race, color, national origin, age, disability, sex, sexual orientation, or gender identity.               Review of your medicines      START taking        Dose / Directions    HYDROcodone-acetaminophen 5-325 MG per tablet   Commonly known as:  NORCO   Used for:  Acute  post-operative pain        Dose:  1-2 tablet   Take 1-2 tablets by mouth every 4 hours as needed for other (Moderate to Severe Pain)   Quantity:  12 tablet   Refills:  0       nitroFURantoin (macrocrystal-monohydrate) 100 MG capsule   Commonly known as:  MACROBID   Used for:  Acute cystitis without hematuria        Dose:  100 mg   Take 1 capsule (100 mg) by mouth 2 times daily for 3 days   Quantity:  6 capsule   Refills:  0         CONTINUE these medicines which have NOT CHANGED        Dose / Directions    albuterol 108 (90 BASE) MCG/ACT Inhaler   Commonly known as:  PROAIR HFA/PROVENTIL HFA/VENTOLIN HFA   Used for:  Mild intermittent asthma, uncomplicated        Dose:  2 puff   Inhale 2 puffs into the lungs every 4 hours as needed for shortness of breath / dyspnea   Quantity:  1 Inhaler   Refills:  11       ALEVE PO        Refills:  0       ASPIRIN PO        Dose:  81 mg   Take 81 mg by mouth daily   Refills:  0       BIOTIN PO        Dose:  5000 mg   Take 5,000 mg by mouth   Refills:  0       CALCIUM + D3 PO        Dose:  1 tablet   Take 1 tablet by mouth daily 600mg Calcium + 800iu Vitamin D3   Refills:  0       fexofenadine 180 MG tablet   Commonly known as:  ALLEGRA   Used for:  Allergic rhinitis, cause unspecified        Dose:  180 mg   Take 1 tablet (180 mg) by mouth daily   Quantity:  90 tablet   Refills:  3       FLAXSEED OIL PO        Dose:  1000 mg   Take 1,000 mg by mouth daily   Refills:  0       fluticasone 50 MCG/ACT spray   Commonly known as:  FLONASE   Used for:  Mild intermittent asthma, uncomplicated        Dose:  2 spray   Spray 2 sprays into both nostrils daily   Quantity:  16 mL   Refills:  11       lisinopril 10 MG tablet   Commonly known as:  PRINIVIL/ZESTRIL   Used for:  Hypertension goal BP (blood pressure) < 140/90        Dose:  10 mg   Take 1 tablet (10 mg) by mouth daily   Quantity:  90 tablet   Refills:  3       multivitamin, therapeutic Tabs tablet        Dose:  1 tablet   Take 1  tablet by mouth daily   Refills:  0       omega-3 acid ethyl esters 1 G capsule   Commonly known as:  Lovaza        Dose:  1 g   Take 1 g by mouth daily   Refills:  0       vitamin D 2000 UNITS tablet   Used for:  Vitamin D deficiency disease        Dose:  2000 Units   Take 2,000 Units by mouth daily   Quantity:  100 tablet   Refills:  3            Where to get your medicines      These medications were sent to [facility] [place], [place] - [address]  [address], [place] [address]     Phone:  [phone]     nitroFURantoin (macrocrystal-monohydrate) 100 MG capsule         Some of these will need a paper prescription and others can be bought over the counter. Ask your nurse if you have questions.     Bring a paper prescription for each of these medications     HYDROcodone-acetaminophen 5-325 MG per tablet                Protect others around you: Learn how to safely use, store and throw away your medicines at www.disposemymeds.org.             Medication List: This is a list of all your medications and when to take them. Check marks below indicate your daily home schedule. Keep this list as a reference.      Medications           Morning Afternoon Evening Bedtime As Needed    albuterol 108 (90 BASE) MCG/ACT Inhaler   Commonly known as:  PROAIR HFA/PROVENTIL HFA/VENTOLIN HFA   Inhale 2 puffs into the lungs every 4 hours as needed for shortness of breath / dyspnea                                ALEVE PO                                ASPIRIN PO   Take 81 mg by mouth daily                                BIOTIN PO   Take 5,000 mg by mouth                                CALCIUM + D3 PO   Take 1 tablet by mouth daily 600mg Calcium + 800iu Vitamin D3                                fexofenadine 180 MG tablet   Commonly known as:  ALLEGRA   Take 1 tablet (180 mg) by mouth daily                                FLAXSEED OIL PO   Take 1,000 mg by mouth daily                                 fluticasone 50 MCG/ACT spray   Commonly known as:  FLONASE   Spray 2 sprays into both nostrils daily                                HYDROcodone-acetaminophen 5-325 MG per tablet   Commonly known as:  NORCO   Take 1-2 tablets by mouth every 4 hours as needed for other (Moderate to Severe Pain)                                lisinopril 10 MG tablet   Commonly known as:  PRINIVIL/ZESTRIL   Take 1 tablet (10 mg) by mouth daily                                multivitamin, therapeutic Tabs tablet   Take 1 tablet by mouth daily                                nitroFURantoin (macrocrystal-monohydrate) 100 MG capsule   Commonly known as:  MACROBID   Take 1 capsule (100 mg) by mouth 2 times daily for 3 days                                omega-3 acid ethyl esters 1 G capsule   Commonly known as:  Lovaza   Take 1 g by mouth daily                                vitamin D 2000 UNITS tablet   Take 2,000 Units by mouth daily

## 2017-10-16 NOTE — IP AVS SNAPSHOT
[facility] Same Day Surgery    [address]    [place] [address]    Phone:  [phone]    Fax:  [phone]                                       After Visit Summary   10/16/2017    Mary Kate Pacheco    MRN: [id number]           After Visit Summary Signature Page     I have received my discharge instructions, and my questions have been answered. I have discussed any challenges I see with this plan with the nurse or doctor.    ..........................................................................................................................................  Patient/Patient Representative Signature      ..........................................................................................................................................  Patient Representative Print Name and Relationship to Patient    ..................................................               ................................................  Date                                            Time    ..........................................................................................................................................  Reviewed by Signature/Title    ...................................................              ..............................................  Date                                                            Time

## 2017-10-16 NOTE — ANESTHESIA POSTPROCEDURE EVALUATION
Patient: Mary Kate Pacheco    Procedure(s):  REVISION AND REMOVAL OF SUBURETHRAL SLIN AND CYSTOSCOPY - Wound Class: II-Clean Contaminated   - Wound Class: II-Clean Contaminated    Diagnosis:MESH EROSION OF VAGINA  Diagnosis Additional Information: No value filed.    Anesthesia Type:  General, LMA    Note:  Anesthesia Post Evaluation    Patient location during evaluation: PACU  Patient participation: Able to fully participate in evaluation  Level of consciousness: awake  Pain management: adequate  Airway patency: patent  Cardiovascular status: acceptable  Respiratory status: acceptable  Hydration status: acceptable  PONV: none     Anesthetic complications: None          Last vitals:  Vitals:    10/16/17 0611 10/16/17 0814   BP: 150/67 138/70   Resp: 16 16   Temp: 36.1  C (97  F) 35.7  C (96.2  F)   SpO2: 97% 98%         Electronically Signed By: David Villa MD  October 16, 2017  8:30 AM

## 2017-10-16 NOTE — DISCHARGE INSTRUCTIONS
Same Day Surgery Discharge Instructions for  Sedation and General Anesthesia       It's not unusual to feel dizzy, light-headed or faint for up to 24 hours after surgery or while taking pain medication.  If you have these symptoms: sit for a few minutes before standing and have someone assist you when you get up to walk or use the bathroom.      You should rest and relax for the next 24 hours. We recommend you make arrangements to have an adult stay with you for at least 24 hours after your discharge.  Avoid hazardous and strenuous activity.      DO NOT DRIVE any vehicle or operate mechanical equipment for 24 hours following the end of your surgery.  Even though you may feel normal, your reactions may be affected by the medication you have received.      Do not drink alcoholic beverages for 24 hours following surgery.       Slowly progress to your regular diet as you feel able. It's not unusual to feel nauseated and/or vomit after receiving anesthesia.  If you develop these symptoms, drink clear liquids (apple juice, ginger ale, broth, 7-up, etc. ) until you feel better.  If your nausea and vomiting persists for 24 hours, please notify your surgeon.        All narcotic pain medications, along with inactivity and anesthesia, can cause constipation. Drinking plenty of liquids and increasing fiber intake will help.      For any questions of a medical nature, call your surgeon.      Do not make important decisions for 24 hours.      If you had general anesthesia, you may have a sore throat for a couple of days related to the breathing tube used during surgery.  You may use Cepacol lozenges to help with this discomfort.  If it worsens or if you develop a fever, contact your surgeon.       If you feel your pain is not well managed with the pain medications prescribed by your surgeon, please contact your surgeon's office to let them know so they can address your concerns.     Cystoscopy Discharge  Instructions      Diet:    Return to the diet that you were on before the procedure, unless you are given specific diet instructions.    It is important to drink 6-8 glasses of fluids per day at home - at least 3-4 glasses should be water.    Activity:    Walk short distances and increase as your strength allows.    You may climb stairs.    Do not do strenuous exercise or heavy lifting until approved by surgeon.    Do not drive while taking narcotic pain medications.    Bathing:    You may take a shower.    Call your physician if these signs/symptoms are present:    Pain that is not relieved by a short rest or ordered pain medications.    Temperature at or above 101.0 F or chills.    Inability or difficulty urinating.  Excessive blood in urine.    Any questions or concerns.      **If you have questions or concerns about your procedure,  call Dr. Bhatti at [phone]**

## 2017-10-16 NOTE — BRIEF OP NOTE
[facility] Brief Operative Note    Pre-operative diagnosis: MESH EROSION OF VAGINA   Post-operative diagnosis SAME   Procedure: Procedure(s):  REVISION AND REMOVAL OF SUBURETHRAL SLIN AND CYSTOSCOPY - Wound Class: II-Clean Contaminated   - Wound Class: II-Clean Contaminated   Surgeon(s): Surgeon(s) and Role:     * Artemio Bhatti MD - Primary   Estimated blood loss: 2 mL    Specimens:   ID Type Source Tests Collected by Time Destination   A :  portion of suburethral sling Other (specify in comments) Other SURGICAL PATHOLOGY EXAM Artemio Bhatti MD 10/16/2017  7:57 AM       Findings: Mesh exposed and eroding on patient's right vaginal sulcus.  Mesh excised and defect closed in 2 layers with 3-0 vicryl.  Cystoscopy revealed a normal bladder.

## 2017-10-16 NOTE — ANESTHESIA PREPROCEDURE EVALUATION
Anesthesia Evaluation     . Pt has had prior anesthetic. Type: General    History of anesthetic complications   - PONV        ROS/MED HX    ENT/Pulmonary:     (+)allergic rhinitis, Intermittent asthma , . .    Neurologic:       Cardiovascular:     (+) Dyslipidemia, hypertension----. : . . . :. .       METS/Exercise Tolerance:     Hematologic:         Musculoskeletal:         GI/Hepatic:  - neg GI/hepatic ROS       Renal/Genitourinary:         Endo:      (-) obesity   Psychiatric:         Infectious Disease:         Malignancy:         Other:                     Physical Exam  Normal systems: cardiovascular and dental    Airway   Mallampati: II  TM distance: >3 FB  Neck ROM: full    Dental     Cardiovascular   Rhythm and rate: regular and normal      Pulmonary    breath sounds clear to auscultation                    Anesthesia Plan      History & Physical Review      ASA Status:  2 .    NPO Status:  > 8 hours    Plan for General and LMA with Propofol induction. Maintenance will be TIVA.    PONV prophylaxis:  Ondansetron (or other 5HT-3) and Dexamethasone or Solumedrol       Postoperative Care  Postoperative pain management:  IV analgesics and Oral pain medications.      Consents  Anesthetic plan, risks, benefits and alternatives discussed with:  Patient..                          .

## 2017-10-16 NOTE — OP NOTE
DATE OF SURGERY:  10/16/2017      HISTORY:  Mary Kate Pacheco is a 57-year-old white female who in 2012 had a retropubic suburethral sling placed.  Within the past year, the patient has noted erosion of the mesh on the left vaginal sulcus.  This is causing some bleeding and mucousy discharge.  She is now admitted for removal and revision of the sling.      PREOPERATIVE DIAGNOSIS:  Vaginal mesh erosion.      POSTOPERATIVE DIAGNOSIS:  Vaginal mesh erosion.      OPERATION:   1.  Excision of exposed vaginal mesh with revision of sling.   2.  Cystoscopy.      SURGEON:  Artemio Bhatti MD      ANESTHESIA:  General.      OPERATIVE FINDINGS:  On examination, the patient had exposed mesh on the left vaginal sulcus.  There was granulation tissue around this area.  There was no evidence of infection.  The mesh was grasped and dissected anteriorly to where it was excised, and then also towards the suburethral area.  The dissection did not extend all the way to the suburethra.  Once excised, the remaining granulation tissue was removed.  Two-layer closure was performed with 3-0 Vicryl.      OPERATION:  Under general endotracheal anesthesia, the patient was placed in lithotomy position and prepped and draped in the usual fashion.  A [place] retractor was used to expose the area.  The area was injected with 0.25% Marcaine with epinephrine.  The mesh was palpable and was grasped with an Allis clamp.  It was then dissected anteriorly as far as we could go, and incised.  The remainder of the mesh was then dissected from the suburethral area, but did not extend all the way down to underneath the urethra, leaving the right side of the mesh intact.  This was excised.  The remainder of the granulation tissue was removed.  Two-layer closure was then performed with 3-0 Vicryl suture, including deep stitches and then superficial vaginal mucosa stitches.      A cystoscopy was performed at the end of the case.  The bladder was normal.   Ureters were normal.  There was no evidence of mesh within the vagina.      Postoperatively, the patient was taken to recovery in good condition.  Mesh was sent to pathology.         ROBINSON ROMERO MD             D: 10/16/2017 08:11   T: 10/16/2017 08:34   MT: LUC#[id number]      Name:     IBETH CORRAL   MRN:      [id number]        Account:        [id number]   :      1960           Procedure Date: 10/16/2017      Document: [id number]       cc: Susan Haase APRN, CNP

## 2017-10-16 NOTE — ANESTHESIA CARE TRANSFER NOTE
Patient: Mary Kate Pacheco    Procedure(s):  REVISION AND REMOVAL OF SUBURETHRAL SLIN AND CYSTOSCOPY - Wound Class: II-Clean Contaminated   - Wound Class: II-Clean Contaminated    Diagnosis: MESH EROSION OF VAGINA  Diagnosis Additional Information: No value filed.    Anesthesia Type:   General, LMA     Note:  Airway :Face Mask  Patient transferred to:PACU  Comments: VSS. Airway and IV patent. Patient comfortable. Report to RN. Stable care transfer.Handoff Report: Identifed the Patient, Identified the Reponsible Provider, Reviewed the pertinent medical history, Discussed the surgical course, Reviewed Intra-OP anesthesia mangement and issues during anesthesia, Set expectations for post-procedure period and Allowed opportunity for questions and acknowledgement of understanding      Vitals: (Last set prior to Anesthesia Care Transfer)    CRNA VITALS  10/16/2017 0742 - 10/16/2017 0817      10/16/2017             Pulse: 74    SpO2: 98 %    Resp Rate (observed): (!)  3                Electronically Signed By: KATHY Bell CRNA  October 16, 2017  8:17 AM

## 2017-10-17 LAB — COPATH REPORT: NORMAL

## 2017-10-28 ENCOUNTER — HEALTH MAINTENANCE LETTER (OUTPATIENT)
Age: 57
End: 2017-10-28

## 2017-11-01 ENCOUNTER — OFFICE VISIT (OUTPATIENT)
Dept: OBGYN | Facility: CLINIC | Age: 57
End: 2017-11-01
Payer: COMMERCIAL

## 2017-11-01 VITALS
SYSTOLIC BLOOD PRESSURE: 122 MMHG | WEIGHT: 164 LBS | HEIGHT: 65 IN | DIASTOLIC BLOOD PRESSURE: 74 MMHG | BODY MASS INDEX: 27.32 KG/M2

## 2017-11-01 DIAGNOSIS — Z48.816 AFTERCARE FOLLOWING SURGERY OF THE GENITOURINARY SYSTEM: Primary | ICD-10-CM

## 2017-11-01 LAB — HGB BLD-MCNC: 13.3 G/DL (ref 11.7–15.7)

## 2017-11-01 PROCEDURE — 85018 HEMOGLOBIN: CPT | Performed by: OBSTETRICS & GYNECOLOGY

## 2017-11-01 PROCEDURE — 99024 POSTOP FOLLOW-UP VISIT: CPT | Performed by: OBSTETRICS & GYNECOLOGY

## 2017-11-01 PROCEDURE — 36415 COLL VENOUS BLD VENIPUNCTURE: CPT | Performed by: OBSTETRICS & GYNECOLOGY

## 2017-11-01 NOTE — PROGRESS NOTES
SUBJECTIVE:                                                   Mary Kate Pacheco is a 57 year old female who presents to clinic today for the following health issue(s):  Patient presents with:  Surgical Followup: Revision and removal of suburethral slin and cystoscopy performed on 10/16/17. Patient states she is doing well, had a little bit of spotting yesterday        HPI:  Patient is seen for follow-up after removal of the portion of a suburethral sling which was eroding.  She is now 2 weeks postop.  She is feeling well.  She had light spotting last night.  She has noticed a decrease in urgency.  She's had no new onset incontinence.    No LMP recorded. Patient has had a hysterectomy..   Patient is sexually active, No obstetric history on file..  Using hysterectomy for contraception.    reports that she has never smoked. She has never used smokeless tobacco.      STD testing offered?  Declined    Health maintenance updated:  yes    Today's PHQ-2 Score:   PHQ-2 ( 1999 [employer]) 5/8/2017   Q1: Little interest or pleasure in doing things 0   Q2: Feeling down, depressed or hopeless 0   PHQ-2 Score 0   Q1: Little interest or pleasure in doing things -   Q2: Feeling down, depressed or hopeless -   PHQ-2 Score -     Today's PHQ-9 Score:   PHQ-9 SCORE 9/26/2017   Total Score 0     Today's TARA-7 Score:   TARA-7 SCORE 9/26/2017   Total Score 0       Problem list and histories reviewed & adjusted, as indicated.  Additional history: as documented.    Patient Active Problem List   Diagnosis     Allergic rhinitis     Premature menopause     iamLUMBAGO     Acute pancreatitis     Female stress incontinence     Mild intermittent asthma     Hyperlipidemia LDL goal <160     Obesity     Hypertension goal BP (blood pressure) < 140/90     Vitamin D deficiency disease     Past Surgical History:   Procedure Laterality Date     C LAMINEC/FACETECT/FORAMIN,LUMBAR 1 SEG  2004    L5-S1     C NONSPECIFIC PROCEDURE  6/96    HUAN & BSO     C  NONSPECIFIC PROCEDURE      TL     C NONSPECIFIC PROCEDURE      breast reduction     C NONSPECIFIC PROCEDURE      ? kidney stones     C TOTAL ABDOM HYSTERECTOMY  1996     and BSO.  Benign reasons.      CHOLECYSTECTOMY, LAPOROSCOPIC  2007     CYSTOSCOPY N/A 10/16/2017    Procedure: CYSTOSCOPY;;  Surgeon: Artemio Bhatti MD;  Location: [facility]     REMOVE SLING VAGINA N/A 10/16/2017    Procedure: REMOVE SLING VAGINA;  REVISION AND REMOVAL OF SUBURETHRAL SLIN AND CYSTOSCOPY;  Surgeon: Artemio Bhatti MD;  Location: [facility]     SLING TRANSPUBO WITHOUT ANTERIOR COLPORRHAPHY  2012    Procedure:SLING TRANSPUBO WITHOUT ANTERIOR COLPORRHAPHY; Pubo Vaginal Sling; Surgeon:SWATI VIEYRA; Location: OR     SURGICAL HISTORY OF 06    L5-S1 anterior and posterior fusion      Social History   Substance Use Topics     Smoking status: Never Smoker     Smokeless tobacco: Never Used     Alcohol use 0.0 oz/week     0 Standard drinks or equivalent per week      Comment: very rarely      Problem (# of Occurrences) Relation (Name,Age of Onset)    Alzheimer Disease (1) Paternal Grandmother:  at 93    C.A.D. (1) Maternal Grandmother:  83 MI and CVA    Cancer - colorectal (1) Maternal Grandmother: late 70's    Circulatory (1) Paternal Grandfather:  brain aneurysm in his 40's     Family History Negative (6) Mother, Sister, Sister, Son, Son, Daughter    HEART DISEASE (1) Father: pacemaker in his late 70's for bradycardia     Hypertension (1) Father: glaucoma    OSTEOPOROSIS (1) Maternal Grandmother    Thyroid Disease (1) Father: elevated thyroid            Current Outpatient Prescriptions   Medication Sig     BIOTIN PO Take 5,000 mg by mouth      Flaxseed, Linseed, (FLAXSEED OIL PO) Take 1,000 mg by mouth daily     Calcium Carb-Cholecalciferol (CALCIUM + D3 PO) Take 1 tablet by mouth daily 600mg Calcium + 800iu Vitamin D3     multivitamin, therapeutic (THERA-VIT) TABS tablet Take 1 tablet  "by mouth daily     ASPIRIN PO Take 81 mg by mouth daily     omega-3 acid ethyl esters (LOVAZA) 1 G capsule Take 1 g by mouth daily     Naproxen Sodium (ALEVE PO)      fluticasone (FLONASE) 50 MCG/ACT spray Spray 2 sprays into both nostrils daily     albuterol (PROAIR HFA/PROVENTIL HFA/VENTOLIN HFA) 108 (90 BASE) MCG/ACT Inhaler Inhale 2 puffs into the lungs every 4 hours as needed for shortness of breath / dyspnea     lisinopril (PRINIVIL/ZESTRIL) 10 MG tablet Take 1 tablet (10 mg) by mouth daily     Cholecalciferol (VITAMIN D) 2000 UNITS tablet Take 2,000 Units by mouth daily     fexofenadine (ALLEGRA) 180 MG tablet Take 1 tablet (180 mg) by mouth daily     No current facility-administered medications for this visit.      Allergies   Allergen Reactions     Dust Mites      itchy runny nose sneezing     Ibuprofen      cancer sores with high doses     Mold      itchy runny nose sneezing     Seasonal Allergies      itchy runny nose sneezing       ROS:  12 point review of systems negative other than symptoms noted below.    OBJECTIVE:     /74  Ht 5' 5\" (1.651 m)  Wt 164 lb (74.4 kg)  BMI 27.29 kg/m2  Body mass index is 27.29 kg/(m^2).    Exam:  Constitutional:  Appearance: Well nourished, well developed alert, in no acute distress  Chest:  Respiratory Effort:  Breathing unlabored  Skin:General Inspection:  No rashes present, no lesions present, no areas of discoloration; Genitalia and Groin:  No rashes present, no lesions present, no areas of discoloration, no masses present.  Neurologic/Psychiatric:  Mental Status:  Oriented X3   Pelvic Exam:  External Genitalia:     Normal appearance for age, no discharge present, no tenderness present, no inflammatory lesions present, color normal  Vagina:     Surgical site on left vaginal sulcus healing well, sutures still present.  Bladder:     Nontender to palpation  Urethra:   Urethral Body:  Urethra palpation normal, urethra structural support normal   Urethral Meatus:  " No erythema or lesions present  Anus:     Anus within normal limits, no hemorrhoids present  Inguinal Lymph Nodes:     No lymphadenopathy present  Pubic Hair:     Normal pubic hair distribution for age  Genitalia and Groin:     No rashes present, no lesions present, no areas of discoloration, no masses present       In-Clinic Test Results:  Results for orders placed or performed in visit on 11/01/17 (from the past 24 hour(s))   Hemoglobin   Result Value Ref Range    Hemoglobin 13.3 11.7 - 15.7 g/dL       ASSESSMENT/PLAN:                                                        ICD-10-CM    1. Aftercare following surgery of the genitourinary system Z48.816            Plan: Return to clinic as needed or for annual exam.    Artemio Bhatti MD  [facility]

## 2017-11-01 NOTE — MR AVS SNAPSHOT
"              After Visit Summary   11/1/2017    Mary Kate Pacheco    MRN: [id number]           Patient Information     Date Of Birth          1960        Visit Information        Provider Department      11/1/2017 9:15 AM Artemio Bhatti MD [facility] [place]        Today's Diagnoses     Aftercare following surgery of the genitourinary system    -  1       Follow-ups after your visit        Who to contact     If you have questions or need follow up information about today's clinic visit or your schedule please contact [facility] [place] directly at [phone].  Normal or non-critical lab and imaging results will be communicated to you by MyChart, letter or phone within 4 business days after the clinic has received the results. If you do not hear from us within 7 days, please contact the clinic through [employer] or phone. If you have a critical or abnormal lab result, we will notify you by phone as soon as possible.  Submit refill requests through [employer] or call your pharmacy and they will forward the refill request to us. Please allow 3 business days for your refill to be completed.          Additional Information About Your Visit        MyChart Information     [employer] gives you secure access to your electronic health record. If you see a primary care provider, you can also send messages to your care team and make appointments. If you have questions, please call your primary care clinic.  If you do not have a primary care provider, please call [phone] and they will assist you.        Care EveryWhere ID     This is your Care EveryWhere ID. This could be used by other organizations to access your [place] medical records  [id number]        Your Vitals Were     Height BMI (Body Mass Index)                5' 5\" (1.651 m) 27.29 kg/m2           Blood Pressure from Last 3 Encounters:   11/01/17 122/74   10/16/17 127/78   10/11/17 120/70    Weight from Last 3 Encounters: "   11/01/17 164 lb (74.4 kg)   10/16/17 161 lb (73 kg)   10/11/17 161 lb (73 kg)              Today, you had the following     No orders found for display       Primary Care Provider Office Phone # Fax #    Susan Rachele Haase, APRN [phone] [id number] [facility] [address]        Equal Access to Services     ELIS KING : Hadii aad ku hadasho Soomaali, waaxda luqadaha, qaybta kaalmada adeegyada, waxay idiin hayaan adeeg khlaure lajada . So [facility] [phone].    ATENCIÓN: Si osmani pelletier, tiene a steen disposición servicios gratuitos de asistencia lingüística. Llame al [phone].    We comply with applicable federal civil rights laws and [place] laws. We do not discriminate on the basis of race, color, national origin, age, disability, sex, sexual orientation, or gender identity.            Thank you!     Thank you for choosing [facility]  for your care. Our goal is always to provide you with excellent care. Hearing back from our patients is one way we can continue to improve our services. Please take a few minutes to complete the written survey that you may receive in the mail after your visit with us. Thank you!             Your Updated Medication List - Protect others around you: Learn how to safely use, store and throw away your medicines at www.disposemymeds.org.          This list is accurate as of: 11/1/17  9:52 AM.  Always use your most recent med list.                   Brand Name Dispense Instructions for use Diagnosis    albuterol 108 (90 BASE) MCG/ACT Inhaler    PROAIR HFA/PROVENTIL HFA/VENTOLIN HFA    1 Inhaler    Inhale 2 puffs into the lungs every 4 hours as needed for shortness of breath / dyspnea    Mild intermittent asthma, uncomplicated       ALEVE PO           ASPIRIN PO      Take 81 mg by mouth daily        BIOTIN PO      Take 5,000 mg by mouth        CALCIUM + D3 PO      Take 1 tablet by mouth daily 600mg Calcium + 800iu Vitamin D3         fexofenadine 180 MG tablet    ALLEGRA    90 tablet    Take 1 tablet (180 mg) by mouth daily    Allergic rhinitis, cause unspecified       FLAXSEED OIL PO      Take 1,000 mg by mouth daily        fluticasone 50 MCG/ACT spray    FLONASE    16 mL    Spray 2 sprays into both nostrils daily    Mild intermittent asthma, uncomplicated       lisinopril 10 MG tablet    PRINIVIL/ZESTRIL    90 tablet    Take 1 tablet (10 mg) by mouth daily    Hypertension goal BP (blood pressure) < 140/90       multivitamin, therapeutic Tabs tablet      Take 1 tablet by mouth daily        omega-3 acid ethyl esters 1 G capsule    Lovaza     Take 1 g by mouth daily        vitamin D 2000 UNITS tablet     100 tablet    Take 2,000 Units by mouth daily    Vitamin D deficiency disease

## 2017-12-19 DIAGNOSIS — E55.9 VITAMIN D DEFICIENCY DISEASE: ICD-10-CM

## 2017-12-21 RX ORDER — CHOLECALCIFEROL (VITAMIN D3) 50 MCG
TABLET ORAL
Qty: 100 TABLET | Refills: 3 | Status: SHIPPED | OUTPATIENT
Start: 2017-12-21

## 2017-12-21 NOTE — TELEPHONE ENCOUNTER
Requested Prescriptions   Pending Prescriptions Disp Refills     VITAMIN D-3 SUPER STRENGTH 2000 UNITS tablet [Pharmacy Med Name: VITAMIN D3 2,000 UNIT TABLET] 100 tablet 3    Last Written Prescription Date:  11/23/16  Last Fill Quantity: 100,  # refills: 3   Last Office Visit with [facility], [facility] or [facility] prescribing provider:  10/11/2017   Future Office Visit:      Sig: TAKE 1 TABLET BY MOUTH DAILY    Vitamin Supplements (Adult) Protocol Passed    12/19/2017  2:51 AM       Passed - High dose Vitamin D not ordered       Passed - Recent or future visit with authorizing provider's specialty    Patient had office visit in the last year or has a visit in the next 30 days with authorizing provider.  See chart review.          Passed - Patient is age 18 or older

## 2017-12-21 NOTE — TELEPHONE ENCOUNTER
Prescription approved per [facility] Refill Protocol.  El Rice RN, BSN

## 2018-04-02 ENCOUNTER — OFFICE VISIT (OUTPATIENT)
Dept: FAMILY MEDICINE | Facility: CLINIC | Age: 58
End: 2018-04-02
Payer: COMMERCIAL

## 2018-04-02 VITALS
HEIGHT: 65 IN | RESPIRATION RATE: 16 BRPM | SYSTOLIC BLOOD PRESSURE: 132 MMHG | HEART RATE: 76 BPM | DIASTOLIC BLOOD PRESSURE: 82 MMHG | TEMPERATURE: 97.7 F | WEIGHT: 178 LBS | BODY MASS INDEX: 29.66 KG/M2

## 2018-04-02 DIAGNOSIS — R30.0 DYSURIA: Primary | ICD-10-CM

## 2018-04-02 DIAGNOSIS — H60.542 ECZEMATOID OTITIS EXTERNA OF LEFT EAR, UNSPECIFIED CHRONICITY: ICD-10-CM

## 2018-04-02 DIAGNOSIS — J45.20 MILD INTERMITTENT ASTHMA WITHOUT COMPLICATION: ICD-10-CM

## 2018-04-02 LAB
ALBUMIN UR-MCNC: NEGATIVE MG/DL
APPEARANCE UR: CLEAR
BILIRUB UR QL STRIP: NEGATIVE
COLOR UR AUTO: YELLOW
GLUCOSE UR STRIP-MCNC: NEGATIVE MG/DL
HGB UR QL STRIP: NEGATIVE
KETONES UR STRIP-MCNC: NEGATIVE MG/DL
LEUKOCYTE ESTERASE UR QL STRIP: ABNORMAL
NITRATE UR QL: NEGATIVE
PH UR STRIP: 6 PH (ref 5–7)
RBC #/AREA URNS AUTO: NORMAL /HPF
SOURCE: ABNORMAL
SP GR UR STRIP: <=1.005 (ref 1–1.03)
UROBILINOGEN UR STRIP-ACNC: 0.2 EU/DL (ref 0.2–1)
WBC #/AREA URNS AUTO: NORMAL /HPF

## 2018-04-02 PROCEDURE — 81001 URINALYSIS AUTO W/SCOPE: CPT | Performed by: FAMILY MEDICINE

## 2018-04-02 PROCEDURE — 99213 OFFICE O/P EST LOW 20 MIN: CPT | Performed by: FAMILY MEDICINE

## 2018-04-02 RX ORDER — NEOMYCIN SULFATE, POLYMYXIN B SULFATE AND HYDROCORTISONE 10; 3.5; 1 MG/ML; MG/ML; [USP'U]/ML
4 SUSPENSION/ DROPS AURICULAR (OTIC) 4 TIMES DAILY
Qty: 10 ML | Refills: 0 | Status: SHIPPED | OUTPATIENT
Start: 2018-04-02 | End: 2018-04-12

## 2018-04-02 NOTE — PATIENT INSTRUCTIONS
You may schedule your mammogram at [facility] by calling [phone] and asking to schedule your mammogram or you may schedule with [facility] in [place] by calling [phone].

## 2018-04-02 NOTE — MR AVS SNAPSHOT
After Visit Summary   4/2/2018    Mary Kate Pacheco    MRN: [id number]           Patient Information     Date Of Birth          1960        Visit Information        Provider Department      4/2/2018 9:30 AM Ana Mayers MD [facility]        Today's Diagnoses     Dysuria    -  1    Mild intermittent asthma without complication        Eczematoid otitis externa of left ear, unspecified chronicity          Care Instructions    You may schedule your mammogram at [facility] by calling [phone] and asking to schedule your mammogram or you may schedule with [facility] in [place] by calling [phone].            Follow-ups after your visit        Future tests that were ordered for you today     Open Future Orders        Priority Expected Expires Ordered    Asthma Action Plan (AAP) Routine  4/2/2019 4/2/2018            Who to contact     If you have questions or need follow up information about today's clinic visit or your schedule please contact [facility] directly at [phone].  Normal or non-critical lab and imaging results will be communicated to you by [employer], letter or phone within 4 business days after the clinic has received the results. If you do not hear from us within 7 days, please contact the clinic through [employer] or phone. If you have a critical or abnormal lab result, we will notify you by phone as soon as possible.  Submit refill requests through [employer] or call your pharmacy and they will forward the refill request to us. Please allow 3 business days for your refill to be completed.          Additional Information About Your Visit        [employer] Information     [employer] gives you secure access to your electronic health record. If you see a primary care provider, you can also send messages to your care team and make appointments. If you have questions, please call your primary care clinic.  If you do  "not have a primary care provider, please call [phone] and they will assist you.        Care EveryWhere ID     This is your Care EveryWhere ID. This could be used by other organizations to access your [facility] medical records  [id number]        Your Vitals Were     Pulse Temperature Respirations Height Breastfeeding? BMI (Body Mass Index)    76 97.7  F (36.5  C) (Oral) 16 5' 4.5\" (1.638 m) No 30.08 kg/m2       Blood Pressure from Last 3 Encounters:   04/02/18 132/82   11/01/17 122/74   10/16/17 127/78    Weight from Last 3 Encounters:   04/02/18 178 lb (80.7 kg)   11/01/17 164 lb (74.4 kg)   10/16/17 161 lb (73 kg)              We Performed the Following     *UA reflex to Microscopic and Culture ([place] and [facility]3] (except [place] and [place])     Urine Microscopic          Today's Medication Changes          These changes are accurate as of 4/2/18 10:03 AM.  If you have any questions, ask your nurse or doctor.               Start taking these medicines.        Dose/Directions    neomycin-polymyxin-hydrocortisone 3.5-09167-4 otic suspension   Commonly known as:  CORTISPORIN   Used for:  Eczematoid otitis externa of left ear, unspecified chronicity   Started by:  Ana Mayers MD        Dose:  4 drop   Place 4 drops Into the left ear 4 times daily for 10 days   Quantity:  10 mL   Refills:  0            Where to get your medicines      These medications were sent to [place] [address] [place] [facility] [address]  [address] [address]     Phone:  [phone]     neomycin-polymyxin-hydrocortisone 3.5-02315-9 otic suspension                Primary Care Provider Office Phone # Fax #    Susan Rachele Haase, APRN [phone] [address] [facility] [address]        Equal Access to Services     LESTER KING AH: Margareth lo Sonadeen, waaxda luqadaha, qaybta kaalmada adeegyada, iveth mills ah. So wa " [phone].    ATENCIÓN: Si osmani pelletier, tiene a steen disposición servicios gratuitos de asistencia lingüística. Dee norman [phone].    We comply with applicable federal civil rights laws and [place] laws. We do not discriminate on the basis of race, color, national origin, age, disability, sex, sexual orientation, or gender identity.            Thank you!     Thank you for choosing [facility]  for your care. Our goal is always to provide you with excellent care. Hearing back from our patients is one way we can continue to improve our services. Please take a few minutes to complete the written survey that you may receive in the mail after your visit with us. Thank you!             Your Updated Medication List - Protect others around you: Learn how to safely use, store and throw away your medicines at www.disposemymeds.org.          This list is accurate as of 4/2/18 10:03 AM.  Always use your most recent med list.                   Brand Name Dispense Instructions for use Diagnosis    albuterol 108 (90 BASE) MCG/ACT Inhaler    PROAIR HFA/PROVENTIL HFA/VENTOLIN HFA    1 Inhaler    Inhale 2 puffs into the lungs every 4 hours as needed for shortness of breath / dyspnea    Mild intermittent asthma, uncomplicated       ALEVE PO           ASPIRIN PO      Take 81 mg by mouth daily        CALCIUM + D3 PO      Take 1 tablet by mouth daily 600mg Calcium + 800iu Vitamin D3        fexofenadine 180 MG tablet    ALLEGRA    90 tablet    Take 1 tablet (180 mg) by mouth daily    Allergic rhinitis, cause unspecified       FLAXSEED OIL PO      Take 1,000 mg by mouth daily        fluticasone 50 MCG/ACT spray    FLONASE    16 mL    Spray 2 sprays into both nostrils daily    Mild intermittent asthma, uncomplicated       lisinopril 10 MG tablet    PRINIVIL/ZESTRIL    90 tablet    Take 1 tablet (10 mg) by mouth daily    Hypertension goal BP (blood pressure) < 140/90       multivitamin, therapeutic Tabs tablet       Take 1 tablet by mouth daily        neomycin-polymyxin-hydrocortisone 3.5-52957-2 otic suspension    CORTISPORIN    10 mL    Place 4 drops Into the left ear 4 times daily for 10 days    Eczematoid otitis externa of left ear, unspecified chronicity       omega-3 acid ethyl esters 1 G capsule    Lovaza     Take 1 g by mouth daily        Vitamin D-3 Super Strength 2000 UNITS tablet   Generic drug:  cholecalciferol     100 tablet    TAKE 1 TABLET BY MOUTH DAILY    Vitamin D deficiency disease

## 2018-04-02 NOTE — PROGRESS NOTES
SUBJECTIVE:   Mary Kate Pacheco is a 57 year old female who presents to clinic today for the following health issues:    She has noted odor for the last month off and on.   No dysuria and no blood.  No fever and no abdominal pain.   She also has left ear itching and feels like there is fluid in there.   It is for the last 1-2 weeks.   It is sore at times.         URINARY TRACT SYMPTOMS  Onset: on and off for a month    Description:   Painful urination (Dysuria): no   Blood in urine (Hematuria): no   Delay in urine (Hesitency): no     Intensity: mild    Progression of Symptoms:  same    Accompanying Signs & Symptoms:  Fever/chills: no   Flank pain no   Nausea and vomiting: no   Any vaginal symptoms: none and vaginal odor  Abdominal/Pelvic Pain: no     History:   History of frequent UTI's: YES  History of kidney stones: YES  Sexually Active: YES  Possibility of pregnancy: No    Precipitating factors:   none    Therapies Tried and outcome: Aleve as needed      Past Medical History:   Diagnosis Date     Acute pancreatitis 7/2006     Allergic rhinitis, cause unspecified      Asthma      Cholecystitis, unspecified 7/2006     Hypertension      Mild intermittent asthma     trigger spring/fall allergies     Other and unspecified disc disorder of lumbar region      PONV (postoperative nausea and vomiting)      Symptomatic states associated with artificial menopause        Past Surgical History:   Procedure Laterality Date     C LAMINEC/FACETECT/FORAMIN,LUMBAR 1 SEG  2004    L5-S1     C NONSPECIFIC PROCEDURE  6/96    HUAN & BSO     C NONSPECIFIC PROCEDURE  1990    TL     C NONSPECIFIC PROCEDURE  1988    breast reduction     C NONSPECIFIC PROCEDURE  1997    ? kidney stones     C TOTAL ABDOM HYSTERECTOMY  6/1996     and BSO.  Benign reasons.      CHOLECYSTECTOMY, LAPOROSCOPIC  7/2007     CYSTOSCOPY N/A 10/16/2017    Procedure: CYSTOSCOPY;;  Surgeon: Artemio Bhatti MD;  Location: [facility]     REMOVE SLING VAGINA N/A  10/16/2017    Procedure: REMOVE SLING VAGINA;  REVISION AND REMOVAL OF SUBURETHRAL SLIN AND CYSTOSCOPY;  Surgeon: Artemio Bhatti MD;  [facility]     SLING TRANSPUBO WITHOUT ANTERIOR COLPORRHAPHY  2012    Procedure:SLING TRANSPUBO WITHOUT ANTERIOR COLPORRHAPHY; Pubo Vaginal Sling; Surgeon:SWATI VIEYRA; Location: OR     SURGICAL HISTORY OF -   06    L5-S1 anterior and posterior fusion       MEDICATIONS:  Current Outpatient Prescriptions   Medication     VITAMIN D-3 SUPER STRENGTH 2000 UNITS tablet     Flaxseed, Linseed, (FLAXSEED OIL PO)     Calcium Carb-Cholecalciferol (CALCIUM + D3 PO)     multivitamin, therapeutic (THERA-VIT) TABS tablet     ASPIRIN PO     omega-3 acid ethyl esters (LOVAZA) 1 G capsule     Naproxen Sodium (ALEVE PO)     fluticasone (FLONASE) 50 MCG/ACT spray     albuterol (PROAIR HFA/PROVENTIL HFA/VENTOLIN HFA) 108 (90 BASE) MCG/ACT Inhaler     lisinopril (PRINIVIL/ZESTRIL) 10 MG tablet     fexofenadine (ALLEGRA) 180 MG tablet     No current facility-administered medications for this visit.        SOCIAL HISTORY:  Social History   Substance Use Topics     Smoking status: Never Smoker     Smokeless tobacco: Never Used     Alcohol use 0.0 oz/week     0 Standard drinks or equivalent per week      Comment: very rarely       Family History   Problem Relation Age of Onset     Hypertension Father      glaucoma     Thyroid Disease Father      elevated thyroid     HEART DISEASE Father      pacemaker in his late 70's for bradycardia      Family History Negative Mother      Cancer - colorectal Maternal Grandmother      late 70's     C.A.D. Maternal Grandmother       83 MI and CVA     OSTEOPOROSIS Maternal Grandmother      Alzheimer Disease Paternal Grandmother       at 93     Circulatory Paternal Grandfather       brain aneurysm in his 40's      Family History Negative Sister      Family History Negative Sister      Family History Negative Son      Family History  "Negative Son      Family History Negative Daughter        Objective:  Blood pressure 132/82, pulse 76, temperature 97.7  F (36.5  C), temperature source Oral, resp. rate 16, height 5' 4.5\" (1.638 m), weight 178 lb (80.7 kg), not currently breastfeeding.   HEENT:  TM's are clear bilaterally.  Oropharynx is clear without tonsillar hypertrophy or exudate.  Conjuctiva are clear.  Neck:  There is no lymphadenopathy or thyroid tenderness or enlargement  Chest: Clear to auscultation bilaterally.  No wheezes, rales or retractions.  CV: Regular rate and rhythm without murmurs, rubs or gallops.    UA: NEG    Assessment:  1. Left otitis externa - not sure if infectious  2. NO cystitis  3. Asthma - stable      Plan:  1. Recommend probiotic  2. Will try cortisporin for 10 days  3. The patient is to follow up as needed for nonresolution of symptoms        "

## 2018-04-03 ASSESSMENT — ASTHMA QUESTIONNAIRES: ACT_TOTALSCORE: 25

## 2018-04-11 DIAGNOSIS — I10 HYPERTENSION GOAL BP (BLOOD PRESSURE) < 140/90: ICD-10-CM

## 2018-04-11 NOTE — TELEPHONE ENCOUNTER
"Requested Prescriptions   Pending Prescriptions Disp Refills     lisinopril (PRINIVIL/ZESTRIL) 10 MG tablet [Pharmacy Med Name: LISINOPRIL 10 MG TABLET] 90 tablet 3    Last Written Prescription Date:  5/8/17  Last Fill Quantity: 90,  # refills: 3   Last Office Visit: 4/2/2018   Future Office Visit:      Sig: TAKE 1 TABLET BY MOUTH DAILY    ACE Inhibitors (Including Combos) Protocol Passed    4/11/2018  1:19 AM       Passed - Blood pressure under 140/90 in past 12 months    BP Readings from Last 3 Encounters:   04/02/18 132/82   11/01/17 122/74   10/16/17 127/78                Passed - Recent (12 mo) or future (30 days) visit within the authorizing provider's specialty    Patient had office visit in the last 12 months or has a visit in the next 30 days with authorizing provider or within the authorizing provider's specialty.  See \"Patient Info\" tab in inbasket, or \"Choose Columns\" in Meds & Orders section of the refill encounter.           Passed - Patient is age 18 or older       Passed - No active pregnancy on record       Passed - Normal serum creatinine on file in past 12 months    Recent Labs   Lab Test  10/11/17   0826   CR  0.66            Passed - Normal serum potassium on file in past 12 months    Recent Labs   Lab Test  10/11/17   0826   POTASSIUM  4.0            Passed - No positive pregnancy test in past 12 months          "

## 2018-04-12 RX ORDER — LISINOPRIL 10 MG/1
TABLET ORAL
Qty: 90 TABLET | Refills: 1 | Status: SHIPPED | OUTPATIENT
Start: 2018-04-12 | End: 2018-06-11

## 2018-04-12 NOTE — TELEPHONE ENCOUNTER
Prescription approved per [facility] Refill Protocol.  Inez Diego RN, BSN

## 2018-04-18 ENCOUNTER — OFFICE VISIT (OUTPATIENT)
Dept: FAMILY MEDICINE | Facility: CLINIC | Age: 58
End: 2018-04-18
Payer: COMMERCIAL

## 2018-04-18 VITALS
TEMPERATURE: 97.8 F | SYSTOLIC BLOOD PRESSURE: 120 MMHG | OXYGEN SATURATION: 99 % | DIASTOLIC BLOOD PRESSURE: 80 MMHG | HEIGHT: 65 IN | RESPIRATION RATE: 12 BRPM | BODY MASS INDEX: 29.82 KG/M2 | WEIGHT: 179 LBS | HEART RATE: 72 BPM

## 2018-04-18 DIAGNOSIS — J45.20 MILD INTERMITTENT ASTHMA, UNCOMPLICATED: ICD-10-CM

## 2018-04-18 DIAGNOSIS — H65.92 OME (OTITIS MEDIA WITH EFFUSION), LEFT: Primary | ICD-10-CM

## 2018-04-18 DIAGNOSIS — H61.21 IMPACTED CERUMEN OF RIGHT EAR: ICD-10-CM

## 2018-04-18 PROCEDURE — 69210 REMOVE IMPACTED EAR WAX UNI: CPT | Performed by: NURSE PRACTITIONER

## 2018-04-18 PROCEDURE — 99213 OFFICE O/P EST LOW 20 MIN: CPT | Mod: 25 | Performed by: NURSE PRACTITIONER

## 2018-04-18 RX ORDER — ALBUTEROL SULFATE 90 UG/1
2 AEROSOL, METERED RESPIRATORY (INHALATION) EVERY 4 HOURS PRN
Qty: 1 INHALER | Refills: 11 | Status: SHIPPED | OUTPATIENT
Start: 2018-04-18

## 2018-04-18 RX ORDER — AMOXICILLIN 875 MG
875 TABLET ORAL 2 TIMES DAILY
Qty: 20 TABLET | Refills: 0 | Status: SHIPPED | OUTPATIENT
Start: 2018-04-18 | End: 2018-06-11

## 2018-04-18 NOTE — PROGRESS NOTES
SUBJECTIVE:   Mary Kate Pacheco is a 57 year old female who presents to clinic today for the following health issues:    RESPIRATORY SYMPTOMS      Duration: 1 month    Description  ear pain left and headache    Severity: mild    Accompanying signs and symptoms: None    History (predisposing factors):  none    Precipitating or alleviating factors: None    Therapies tried and outcome:  Recent antibiotic for sinus infection and ear drops    Seen on 4/6 for left ear itching and ear drops were prescribed, last used 04/17. Left ear continues to itch and bother her, hearing is muffled with pain. Associated symptoms of swollen neck, dull headache, facial pressure and sore throat began this past weekend. She's taking allegra and Flonase daily. Uses albuterol inhaler rarely. Denies congestion, fever, cough.   History of asthma:  ACT of 25.    Urinary:  Continues to have strong urine odor in the morning.  Drinking adequate fluids and taking daily probiotic. UA completed on 4/6 with normal results.     Problem list and histories reviewed & adjusted, as indicated.  Additional history: as documented    Patient Active Problem List   Diagnosis     Allergic rhinitis     Premature menopause     [email or web address]     Acute pancreatitis     Female stress incontinence     Mild intermittent asthma     Hyperlipidemia LDL goal <160     Obesity     Hypertension goal BP (blood pressure) < 140/90     Vitamin D deficiency disease     Past Surgical History:   Procedure Laterality Date     C LAMINEC/FACETECT/FORAMIN,LUMBAR 1 SEG  2004    L5-S1     C NONSPECIFIC PROCEDURE  6/96    HUAN & BSO     C NONSPECIFIC PROCEDURE  1990    TL     C NONSPECIFIC PROCEDURE  1988    breast reduction     C NONSPECIFIC PROCEDURE  1997    ? kidney stones     C TOTAL ABDOM HYSTERECTOMY  6/1996     and BSO.  Benign reasons.      CHOLECYSTECTOMY, LAPOROSCOPIC  7/2007     CYSTOSCOPY N/A 10/16/2017    Procedure: CYSTOSCOPY;;  Surgeon: Artemio Bhatti MD;  Location:   [facility]     REMOVE SLING VAGINA N/A 10/16/2017    Procedure: REMOVE SLING VAGINA;  REVISION AND REMOVAL OF SUBURETHRAL SLIN AND CYSTOSCOPY;  Surgeon: Artemio Bhatti MD;  Location[facility]     SLING TRANSPUBO WITHOUT ANTERIOR COLPORRHAPHY  2012    Procedure:SLING TRANSPUBO WITHOUT ANTERIOR COLPORRHAPHY; Pubo Vaginal Sling; Surgeon:SWATI VIEYRA; Location:[facility]     SURGICAL HISTORY OF -   06    L5-S1 anterior and posterior fusion       Social History   Substance Use Topics     Smoking status: Never Smoker     Smokeless tobacco: Never Used     Alcohol use 0.0 oz/week     0 Standard drinks or equivalent per week      Comment: very rarely     Family History   Problem Relation Age of Onset     Hypertension Father      glaucoma     Thyroid Disease Father      elevated thyroid     HEART DISEASE Father      pacemaker in his late 70's for bradycardia      Family History Negative Mother      Cancer - colorectal Maternal Grandmother      late 70's     C.A.D. Maternal Grandmother       83 MI and CVA     OSTEOPOROSIS Maternal Grandmother      Alzheimer Disease Paternal Grandmother       at 93     Circulatory Paternal Grandfather       brain aneurysm in his 40's      Family History Negative Sister      Family History Negative Sister      Family History Negative Son      Family History Negative Son      Family History Negative Daughter          Current Outpatient Prescriptions   Medication Sig Dispense Refill     albuterol (PROAIR HFA/PROVENTIL HFA/VENTOLIN HFA) 108 (90 BASE) MCG/ACT Inhaler Inhale 2 puffs into the lungs every 4 hours as needed for shortness of breath / dyspnea 1 Inhaler 11     ASPIRIN PO Take 81 mg by mouth daily       Calcium Carb-Cholecalciferol (CALCIUM + D3 PO) Take 1 tablet by mouth daily 600mg Calcium + 800iu Vitamin D3       fexofenadine (ALLEGRA) 180 MG tablet Take 1 tablet (180 mg) by mouth daily 90 tablet 3     Flaxseed, Linseed, (FLAXSEED OIL PO) Take 1,000 mg by mouth daily   "     fluticasone (FLONASE) 50 MCG/ACT spray Spray 2 sprays into both nostrils daily 16 mL 11     lisinopril (PRINIVIL/ZESTRIL) 10 MG tablet TAKE 1 TABLET BY MOUTH DAILY 90 tablet 1     multivitamin, therapeutic (THERA-VIT) TABS tablet Take 1 tablet by mouth daily       Naproxen Sodium (ALEVE PO)        omega-3 acid ethyl esters (LOVAZA) 1 G capsule Take 1 g by mouth daily       VITAMIN D-3 SUPER STRENGTH 2000 UNITS tablet TAKE 1 TABLET BY MOUTH DAILY 100 tablet 3     Allergies   Allergen Reactions     Dust Mites      itchy runny nose sneezing     Ibuprofen      cancer sores with high doses     Mold      itchy runny nose sneezing     Seasonal Allergies      itchy runny nose sneezing       Reviewed and updated as needed this visit by clinical staff       Reviewed and updated as needed this visit by Provider         ROS:  Positive for ear problems   Constitutional, HEENT, cardiovascular, pulmonary, and psych systems are negative, except as otherwise noted.    This document serves as a record of the services and decisions personally performed and made by Susan Haase, CNP. It was created on her behalf by Jessa Barrett, a trained medical scribe. The creation of this document is based on the provider's statements to the medical scribe.  Jessa Barrett 10:06 AM April 18, 2018  OBJECTIVE:   /80 (BP Location: Left arm, Patient Position: Chair, Cuff Size: Adult Regular)  Pulse 72  Temp 97.8  F (36.6  C) (Oral)  Resp 12  Ht 1.638 m (5' 4.5\")  Wt 81.2 kg (179 lb)  SpO2 99%  BMI 30.25 kg/m2  Body mass index is 30.25 kg/(m^2).  GENERAL: healthy, alert and no distress  HENT: right ear canal with cerumen, left ear canal normal.  After irrigation right TM normal, left TM with erythema and retraction. Normal nose and throat.    NECK: no asymmetry, masses, or scars and thyroid normal to palpation, adenopathy   RESP: lungs clear to auscultation - no rales, rhonchi or wheezes  CV: regular rate and rhythm, normal S1 S2, no S3 or " S4, no murmur, click or rub, no peripheral edema   PSYCH: mentation appears normal, affect normal/bright    ASSESSMENT/PLAN:   Mary Kate was seen today for ear problem.    Diagnoses and all orders for this visit:    OME (otitis media with effusion), left  -     amoxicillin (AMOXIL) 875 MG tablet; Take 1 tablet (875 mg) by mouth 2 times daily    Impacted cerumen of right ear  -     REMOVE IMPACTED CERUMEN  Cerumen impaction:  Using warm tap water cerumen removed from right ear, after removal TM intact without erythema.    Mild intermittent asthma, uncomplicated: ACT of 25, well controlled.   -     albuterol (PROAIR HFA/PROVENTIL HFA/VENTOLIN HFA) 108 (90 Base) MCG/ACT Inhaler; Inhale 2 puffs into the lungs every 4 hours as needed for shortness of breath / dyspnea    Other orders  -     MIGRAINE ACTION PLAN    Follow up in 4 weeks for physical exam, arrive fasting  The information in this document, created by the medical scribe for me, accurately reflects the services I personally performed and the decisions made by me. I have reviewed and approved this document for accuracy prior to leaving the patient care area.  April 18, 2018 10:10 AM  Susan Haase, APRN [facility]

## 2018-04-18 NOTE — MR AVS SNAPSHOT
After Visit Summary   4/18/2018    Mary Kate Pacheco    MRN: [id number]           Patient Information     Date Of Birth          1960        Visit Information        Provider Department      4/18/2018 9:45 AM Haase, Susan Rachele, APRN CNP [facility]        Today's Diagnoses     OME (otitis media with effusion), left    -  1    Mild intermittent asthma, uncomplicated           Follow-ups after your visit        Follow-up notes from your care team     Return in about 4 weeks (around 5/16/2018) for Physical Exam, Lab Work.      Who to contact     If you have questions or need follow up information about today's clinic visit or your schedule please contact [facility] directly at [phone].  Normal or non-critical lab and imaging results will be communicated to you by Conatixhart, letter or phone within 4 business days after the clinic has received the results. If you do not hear from us within 7 days, please contact the clinic through Conatixhart or phone. If you have a critical or abnormal lab result, we will notify you by phone as soon as possible.  Submit refill requests through Nautal or call your pharmacy and they will forward the refill request to us. Please allow 3 business days for your refill to be completed.          Additional Information About Your Visit        MyChart Information     Nautal gives you secure access to your electronic health record. If you see a primary care provider, you can also send messages to your care team and make appointments. If you have questions, please call your primary care clinic.  If you do not have a primary care provider, please call [phone] and they will assist you.        Care EveryWhere ID     This is your Care EveryWhere ID. This could be used by other organizations to access your [facility] medical records  [id number]        Your Vitals Were     Pulse Temperature Respirations Height Pulse Oximetry BMI (Body Mass  "Index)    72 97.8  F (36.6  C) (Oral) 12 5' 4.5\" (1.638 m) 99% 30.25 kg/m2       Blood Pressure from Last 3 Encounters:   04/18/18 120/80   04/02/18 132/82   11/01/17 122/74    Weight from Last 3 Encounters:   04/18/18 179 lb (81.2 kg)   04/02/18 178 lb (80.7 kg)   11/01/17 164 lb (74.4 kg)              We Performed the Following     Asthma Action Plan (AAP)     MIGRAINE ACTION PLAN          Today's Medication Changes          These changes are accurate as of 4/18/18 10:18 AM.  If you have any questions, ask your nurse or doctor.               Start taking these medicines.        Dose/Directions    amoxicillin 875 MG tablet   Commonly known as:  AMOXIL   Used for:  OME (otitis media with effusion), left   Started by:  Haase, Susan Rachele, APRN CNP        Dose:  875 mg   Take 1 tablet (875 mg) by mouth 2 times daily   Quantity:  20 tablet   Refills:  0            Where to get your medicines      These medications were sent to [place] [address] [place] [facility] [address] [facility]  [address] [facility] [address]     Phone:  [phone]     albuterol 108 (90 Base) MCG/ACT Inhaler    amoxicillin 875 MG tablet                Primary Care Provider Office Phone # Fax #    Susan Rachele Haase, APRN [phone] [address] [facility] [address]        Equal Access to Services     [facility] CHRISTINE : Hadii lesley roque hadasho Sokayali, waaxda luqadaha, qaybta kaalmada adeegyada, waxay jose enrique mills . So [facility] [phone].    ATENCIÓN: Si habla español, tiene a steen disposición servicios gratuitos de asistencia lingüística. Llmissy al [phone].    We comply with applicable federal civil rights laws and [place] laws. We do not discriminate on the basis of race, color, national origin, age, disability, sex, sexual orientation, or gender identity.            Thank you!     Thank you for choosing [facility]  for your care. Our goal is always to provide you with " excellent care. Hearing back from our patients is one way we can continue to improve our services. Please take a few minutes to complete the written survey that you may receive in the mail after your visit with us. Thank you!             Your Updated Medication List - Protect others around you: Learn how to safely use, store and throw away your medicines at www.disposemymeds.org.          This list is accurate as of 4/18/18 10:18 AM.  Always use your most recent med list.                   Brand Name Dispense Instructions for use Diagnosis    albuterol 108 (90 Base) MCG/ACT Inhaler    PROAIR HFA/PROVENTIL HFA/VENTOLIN HFA    1 Inhaler    Inhale 2 puffs into the lungs every 4 hours as needed for shortness of breath / dyspnea    Mild intermittent asthma, uncomplicated       ALEVE PO           amoxicillin 875 MG tablet    AMOXIL    20 tablet    Take 1 tablet (875 mg) by mouth 2 times daily    OME (otitis media with effusion), left       ASPIRIN PO      Take 81 mg by mouth daily        CALCIUM + D3 PO      Take 1 tablet by mouth daily 600mg Calcium + 800iu Vitamin D3        fexofenadine 180 MG tablet    ALLEGRA    90 tablet    Take 1 tablet (180 mg) by mouth daily    Allergic rhinitis, cause unspecified       FLAXSEED OIL PO      Take 1,000 mg by mouth daily        fluticasone 50 MCG/ACT spray    FLONASE    16 mL    Spray 2 sprays into both nostrils daily    Mild intermittent asthma, uncomplicated       lisinopril 10 MG tablet    PRINIVIL/ZESTRIL    90 tablet    TAKE 1 TABLET BY MOUTH DAILY    Hypertension goal BP (blood pressure) < 140/90       multivitamin, therapeutic Tabs tablet      Take 1 tablet by mouth daily        omega-3 acid ethyl esters 1 g capsule    Lovaza     Take 1 g by mouth daily        Vitamin D-3 Super Strength 2000 units tablet   Generic drug:  cholecalciferol     100 tablet    TAKE 1 TABLET BY MOUTH DAILY    Vitamin D deficiency disease

## 2018-06-11 ENCOUNTER — OFFICE VISIT (OUTPATIENT)
Dept: FAMILY MEDICINE | Facility: CLINIC | Age: 58
End: 2018-06-11
Payer: COMMERCIAL

## 2018-06-11 VITALS
SYSTOLIC BLOOD PRESSURE: 136 MMHG | DIASTOLIC BLOOD PRESSURE: 78 MMHG | RESPIRATION RATE: 12 BRPM | HEIGHT: 65 IN | HEART RATE: 80 BPM | WEIGHT: 186 LBS | OXYGEN SATURATION: 99 % | TEMPERATURE: 98 F | BODY MASS INDEX: 30.99 KG/M2

## 2018-06-11 DIAGNOSIS — J45.20 MILD INTERMITTENT ASTHMA WITHOUT COMPLICATION: ICD-10-CM
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
## 2018-06-11 DIAGNOSIS — I10 HYPERTENSION GOAL BP (BLOOD PRESSURE) < 140/90: ICD-10-CM

## 2018-06-11 DIAGNOSIS — Z12.31 VISIT FOR SCREENING MAMMOGRAM: ICD-10-CM

## 2018-06-11 DIAGNOSIS — E55.9 VITAMIN D DEFICIENCY DISEASE: ICD-10-CM

## 2018-06-11 DIAGNOSIS — Z00.00 ROUTINE GENERAL MEDICAL EXAMINATION AT A HEALTH CARE FACILITY: Primary | ICD-10-CM

## 2018-06-11 DIAGNOSIS — E78.5 HYPERLIPIDEMIA LDL GOAL <160: ICD-10-CM

## 2018-06-11 LAB
BASOPHILS # BLD AUTO: 0.1 10E9/L (ref 0–0.2)
BASOPHILS NFR BLD AUTO: 1.2 %
DEPRECATED CALCIDIOL+CALCIFEROL SERPL-MC: 51 UG/L (ref 20–75)
DIFFERENTIAL METHOD BLD: NORMAL
EOSINOPHIL # BLD AUTO: 0.3 10E9/L (ref 0–0.7)
EOSINOPHIL NFR BLD AUTO: 5.8 %
ERYTHROCYTE [DISTWIDTH] IN BLOOD BY AUTOMATED COUNT: 12.7 % (ref 10–15)
HBA1C MFR BLD: 5.1 % (ref 0–5.6)
HCT VFR BLD AUTO: 39 % (ref 35–47)
HGB BLD-MCNC: 13 G/DL (ref 11.7–15.7)
LYMPHOCYTES # BLD AUTO: 2.1 10E9/L (ref 0.8–5.3)
LYMPHOCYTES NFR BLD AUTO: 35.7 %
MCH RBC QN AUTO: 30.4 PG (ref 26.5–33)
MCHC RBC AUTO-ENTMCNC: 33.3 G/DL (ref 31.5–36.5)
MCV RBC AUTO: 91 FL (ref 78–100)
MONOCYTES # BLD AUTO: 0.7 10E9/L (ref 0–1.3)
MONOCYTES NFR BLD AUTO: 12 %
NEUTROPHILS # BLD AUTO: 2.7 10E9/L (ref 1.6–8.3)
NEUTROPHILS NFR BLD AUTO: 45.3 %
PLATELET # BLD AUTO: 254 10E9/L (ref 150–450)
RBC # BLD AUTO: 4.27 10E12/L (ref 3.8–5.2)
WBC # BLD AUTO: 5.9 10E9/L (ref 4–11)

## 2018-06-11 PROCEDURE — 77067 SCR MAMMO BI INCL CAD: CPT | Mod: TC

## 2018-06-11 PROCEDURE — 80053 COMPREHEN METABOLIC PANEL: CPT | Performed by: NURSE PRACTITIONER

## 2018-06-11 PROCEDURE — 82306 VITAMIN D 25 HYDROXY: CPT | Performed by: NURSE PRACTITIONER

## 2018-06-11 PROCEDURE — 85025 COMPLETE CBC W/AUTO DIFF WBC: CPT | Performed by: NURSE PRACTITIONER

## 2018-06-11 PROCEDURE — 83036 HEMOGLOBIN GLYCOSYLATED A1C: CPT | Performed by: NURSE PRACTITIONER

## 2018-06-11 PROCEDURE — 99396 PREV VISIT EST AGE 40-64: CPT | Performed by: NURSE PRACTITIONER

## 2018-06-11 PROCEDURE — 80061 LIPID PANEL: CPT | Performed by: NURSE PRACTITIONER

## 2018-06-11 PROCEDURE — 36415 COLL VENOUS BLD VENIPUNCTURE: CPT | Performed by: NURSE PRACTITIONER

## 2018-06-11 PROCEDURE — 84443 ASSAY THYROID STIM HORMONE: CPT | Performed by: NURSE PRACTITIONER

## 2018-06-11 RX ORDER — HYDROCORTISONE VALERATE 2 MG/G
OINTMENT TOPICAL
Qty: 60 G | Refills: 0 | Status: SHIPPED | OUTPATIENT
Start: 2018-06-11 | End: 2018-07-03

## 2018-06-11 RX ORDER — LISINOPRIL 10 MG/1
10 TABLET ORAL DAILY
Qty: 90 TABLET | Refills: 1 | Status: SHIPPED | OUTPATIENT
Start: 2018-06-11 | End: 2019-02-26

## 2018-06-11 NOTE — PROGRESS NOTES
SUBJECTIVE:   CC: Mary Kate Pacheco is an 57 year old woman who presents for preventive health visit.     Physical   Annual:     Getting at least 3 servings of Calcium per day::  Yes    Bi-annual eye exam::  Yes    Dental care twice a year::  Yes    Sleep apnea or symptoms of sleep apnea::  None    Diet::  Regular (no restrictions)    Frequency of exercise::  2-3 days/week    Duration of exercise::  15-30 minutes    Taking medications regularly::  Yes    Medication side effects::  None    Additional concerns today::  YES          Cervical cancer screening: Discontinued to history of total hysterectomy. Denies vaginal bleeding.     Breast cancer screening: Mammo scheduled today. Last mammo , results normal.     Colon cancer screening: Last colonoscopy 11/2011, results normal. Repeat screening in 10 years.     Allergies: Takes Allegra and Flonase regularly to control symptoms.     Hypertension: Well controlled on lisinopril 10 mg daily. BP is 136/78. Patient checks BP at home, 130's.     Asthma: Symptoms well controlled, uses albuterol prn.     Vitamin D deficiency: Continues on vitamin D 2000 units. Last lab 64.     Thyroid: Curious about thyroid function. She often feels cold and has mild hair loss. Associated weight gain, however she notes this may be from recent poor eating habits. Last TSH 2.15.     Skin: Mole present on right thigh, she describes it as crusty and has grown since initially noticing. Previous moles removed.     Today's PHQ-2 Score:   PHQ-2 ( 1999 [employer]) 6/11/2018   Q1: Little interest or pleasure in doing things 0   Q2: Feeling down, depressed or hopeless 0   PHQ-2 Score 0   Q1: Little interest or pleasure in doing things Not at all   Q2: Feeling down, depressed or hopeless Not at all   PHQ-2 Score 0       Abuse: Current or Past(Physical, Sexual or Emotional)- No  Do you feel safe in your environment - Yes    Social History   Substance Use Topics     Smoking status: Never Smoker     Smokeless  tobacco: Never Used     Alcohol use 0.0 oz/week     0 Standard drinks or equivalent per week      Comment: very rarely     Alcohol Use 6/11/2018   If you drink alcohol do you typically have greater than 3 drinks per day OR greater than 7 drinks per week? Not Applicable   No flowsheet data found.    Reviewed orders with patient.  Reviewed health maintenance and updated orders accordingly - Yes  Patient Active Problem List   Diagnosis     Allergic rhinitis     Premature menopause     iamLUMBAGO     Acute pancreatitis     Female stress incontinence     Mild intermittent asthma     Hyperlipidemia LDL goal <160     Obesity     Hypertension goal BP (blood pressure) < 140/90     Vitamin D deficiency disease     Past Surgical History:   Procedure Laterality Date     C LAMINEC/FACETECT/FORAMIN,LUMBAR 1 SEG  2004    L5-S1     C NONSPECIFIC PROCEDURE  6/96    HUAN & BSO     C NONSPECIFIC PROCEDURE  1990    TL     C NONSPECIFIC PROCEDURE  1988    breast reduction     C NONSPECIFIC PROCEDURE  1997    ? kidney stones     C TOTAL ABDOM HYSTERECTOMY  6/1996     and BSO.  Benign reasons.      CHOLECYSTECTOMY, LAPOROSCOPIC  7/2007     CYSTOSCOPY N/A 10/16/2017    Procedure: CYSTOSCOPY;;  Surgeon: Artemio Bhatti MD;  Location: [facility]     REMOVE SLING VAGINA N/A 10/16/2017    Procedure: REMOVE SLING VAGINA;  REVISION AND REMOVAL OF SUBURETHRAL SLIN AND CYSTOSCOPY;  Surgeon: Artemio Bhatti MD;  Location: [facility]     SLING TRANSPUBO WITHOUT ANTERIOR COLPORRHAPHY  6/4/2012    Procedure:SLING TRANSPUBO WITHOUT ANTERIOR COLPORRHAPHY; Pubo Vaginal Sling; Surgeon:SWATI VIEYRA; Location:[facility] OR     SURGICAL HISTORY OF -   1-25-06    L5-S1 anterior and posterior fusion       Social History   Substance Use Topics     Smoking status: Never Smoker     Smokeless tobacco: Never Used     Alcohol use 0.0 oz/week     0 Standard drinks or equivalent per week      Comment: very rarely     Family History   Problem Relation Age of Onset      Hypertension Father      glaucoma     Thyroid Disease Father      elevated thyroid     HEART DISEASE Father      pacemaker in his late 70's for bradycardia      Family History Negative Mother      Cancer - colorectal Maternal Grandmother      late 70's     C.A.D. Maternal Grandmother       83 MI and CVA     OSTEOPOROSIS Maternal Grandmother      Alzheimer Disease Paternal Grandmother       at 93     Circulatory Paternal Grandfather       brain aneurysm in his 40's      Family History Negative Sister      Family History Negative Sister      Family History Negative Son      Family History Negative Son      Family History Negative Daughter          Current Outpatient Prescriptions   Medication Sig Dispense Refill     albuterol (PROAIR HFA/PROVENTIL HFA/VENTOLIN HFA) 108 (90 Base) MCG/ACT Inhaler Inhale 2 puffs into the lungs every 4 hours as needed for shortness of breath / dyspnea 1 Inhaler 11     ASPIRIN PO Take 81 mg by mouth daily       Calcium Carb-Cholecalciferol (CALCIUM + D3 PO) Take 1 tablet by mouth daily 600mg Calcium + 800iu Vitamin D3       fexofenadine (ALLEGRA) 180 MG tablet Take 1 tablet (180 mg) by mouth daily 90 tablet 3     Flaxseed, Linseed, (FLAXSEED OIL PO) Take 1,000 mg by mouth daily       fluticasone (FLONASE) 50 MCG/ACT spray Spray 2 sprays into both nostrils daily 16 mL 11     lisinopril (PRINIVIL/ZESTRIL) 10 MG tablet Take 1 tablet (10 mg) by mouth daily 90 tablet 1     multivitamin, therapeutic (THERA-VIT) TABS tablet Take 1 tablet by mouth daily       Naproxen Sodium (ALEVE PO)        omega-3 acid ethyl esters (LOVAZA) 1 G capsule Take 1 g by mouth daily       VITAMIN D-3 SUPER STRENGTH 2000 UNITS tablet TAKE 1 TABLET BY MOUTH DAILY 100 tablet 3     [DISCONTINUED] lisinopril (PRINIVIL/ZESTRIL) 10 MG tablet TAKE 1 TABLET BY MOUTH DAILY 90 tablet 1     Allergies   Allergen Reactions     Dust Mites      itchy runny nose sneezing     Ibuprofen      cancer sores with high doses      Mold      itchy runny nose sneezing     Seasonal Allergies      itchy runny nose sneezing       Patient over age 50, mutual decision to screen reflected in health maintenance.    Pertinent mammograms are reviewed under the imaging tab.  History of abnormal Pap smear:   NO - age 30- 65 PAP every 3 years recommended  Last 3 Pap Results:   PAP (no units)   Date Value   04/06/2009 NIL   07/19/2005 NIL       Reviewed and updated as needed this visit by clinical staff         Reviewed and updated as needed this visit by Provider        Past Medical History:   Diagnosis Date     Acute pancreatitis 7/2006     Allergic rhinitis, cause unspecified      Asthma      Cholecystitis, unspecified 7/2006     Hypertension      Mild intermittent asthma     trigger spring/fall allergies     Other and unspecified disc disorder of lumbar region      PONV (postoperative nausea and vomiting)      Symptomatic states associated with artificial menopause       Past Surgical History:   Procedure Laterality Date     C LAMINEC/FACETECT/FORAMIN,LUMBAR 1 SEG  2004    L5-S1     C NONSPECIFIC PROCEDURE  6/96    HUAN & BSO     C NONSPECIFIC PROCEDURE  1990    TL     C NONSPECIFIC PROCEDURE  1988    breast reduction     C NONSPECIFIC PROCEDURE  1997    ? kidney stones     C TOTAL ABDOM HYSTERECTOMY  6/1996     and BSO.  Benign reasons.      CHOLECYSTECTOMY, LAPOROSCOPIC  7/2007     CYSTOSCOPY N/A 10/16/2017    Procedure: CYSTOSCOPY;;  Surgeon: Artemio Bhatti MD;  Location: [facility]     REMOVE SLING VAGINA N/A 10/16/2017    Procedure: REMOVE SLING VAGINA;  REVISION AND REMOVAL OF SUBURETHRAL SLIN AND CYSTOSCOPY;  Surgeon: Artemio Bhatti MD;  Location: [facility]     SLING TRANSPUBO WITHOUT ANTERIOR COLPORRHAPHY  6/4/2012    Procedure:SLING TRANSPUBO WITHOUT ANTERIOR COLPORRHAPHY; Pubo Vaginal Sling; Surgeon:SWATI VIEYRA; Location: OR     SURGICAL HISTORY OF - 1-25-06    L5-S1 anterior and posterior fusion       Review of  "Systems  CONSTITUTIONAL: NEGATIVE for fever, chills, change in weight  INTEGUMENTARY/SKIN: NEGATIVE for worrisome rashes, moles or lesions  EYES: NEGATIVE for vision changes or irritation  ENT: NEGATIVE for ear, mouth and throat problems  RESP: POSITIVE for significant cough NEGATIVE for SOB  BREAST: NEGATIVE for masses, tenderness or discharge  CV: NEGATIVE for chest pain, palpitations or peripheral edema  GI: NEGATIVE for nausea, abdominal pain, heartburn, or change in bowel habits  : NEGATIVE for unusual urinary or vaginal symptoms. No vaginal bleeding.  MUSCULOSKELETAL: NEGATIVE for significant arthralgias or myalgia  NEURO: NEGATIVE for weakness, dizziness or paresthesias  PSYCHIATRIC: NEGATIVE for changes in mood or affect      This document serves as a record of the services and decisions personally performed and made by Susan Haase, CNP. It was created on her behalf by Jessa Barrett, a trained medical scribe. The creation of this document is based on the provider's statements to the medical scribe.  Jessa Barrett 8:10 AM June 11, 2018  OBJECTIVE:   /78  Pulse 80  Temp 98  F (36.7  C) (Oral)  Resp 12  Ht 1.638 m (5' 4.5\")  Wt 84.4 kg (186 lb)  SpO2 99%  BMI 31.43 kg/m2  Physical Exam  GENERAL APPEARANCE: healthy, alert and no distress  HENT: ear canals and TM's normal, nose and mouth without ulcers or lesions, oropharynx clear and oral mucous membranes moist  NECK: no adenopathy, no asymmetry, masses, or scars and thyroid normal to palpation  RESP: lungs clear to auscultation - no rales, rhonchi or wheezes  BREAST: normal without masses, tenderness or nipple discharge and no palpable axillary masses or adenopathy  CV: regular rate and rhythm, normal S1 S2, no S3 or S4, no murmur, click or rub, no peripheral edema and peripheral pulses strong  ABDOMEN: soft, nontender, no hepatosplenomegaly, no masses and bowel sounds normal  MS: no musculoskeletal defects are noted and gait is age appropriate " "without ataxia  SKIN: no suspicious lesions or rashes, right lateral thigh 2cm light brown nevus    NEURO: Normal strength and tone, sensory exam grossly normal, mentation intact and speech normal  PSYCH: mentation appears normal and affect normal/bright    ASSESSMENT/PLAN:   Mary Kate was seen today for physical.    Diagnoses and all orders for this visit:    Routine general medical examination at a health care facility  -     CBC with platelets differential  -     Comprehensive metabolic panel  -     Lipid panel reflex to direct LDL Fasting  -     Hemoglobin A1c  -     Vitamin D Deficiency  -     TSH with free T4 reflex    Mild intermittent asthma without complication: asthma well controlled, ACT 24    Hypertension goal BP (blood pressure) < 140/90:  /78, well controlled on lisinopril. Encouraged to continue to check BP at home, follow up if elevated   -     lisinopril (PRINIVIL/ZESTRIL) 10 MG tablet; Take 1 tablet (10 mg) by mouth daily    Hyperlipidemia LDL goal <160   Lipid panel reflex to direct LDL Fasting    Vitamin D deficiency disease: taking vitamin D 2,000 iu every day  Vitamin D Deficiency      COUNSELING:  Reviewed preventive health counseling, as reflected in patient instructions       Regular exercise       Healthy diet/nutrition       reports that she has never smoked. She has never used smokeless tobacco.    Estimated body mass index is 31.43 kg/(m^2) as calculated from the following:    Height as of this encounter: 1.638 m (5' 4.5\").    Weight as of this encounter: 84.4 kg (186 lb).   Weight management plan: Discussed healthy diet and exercise guidelines and patient will follow up in 12 months in clinic to re-evaluate.    Counseling Resources:  ATP IV Guidelines  Pooled Cohorts Equation Calculator  Breast Cancer Risk Calculator  FRAX Risk Assessment  ICSI Preventive Guidelines  Dietary Guidelines for Americans, 2010  USDA's MyPlate  ASA Prophylaxis  Lung CA Screening  Follow up in 1 year, " sooner as needed.   The information in this document, created by the medical scribe for me, accurately reflects the services I personally performed and the decisions made by me. I have reviewed and approved this document for accuracy prior to leaving the patient care area.  June 11, 2018 8:19 AM  Susan Haase, APRN [facility]

## 2018-06-11 NOTE — MR AVS SNAPSHOT
After Visit Summary   6/11/2018    Mary Kate Pacheco    MRN: [id number]           Patient Information     Date Of Birth          1960        Visit Information        Provider Department      6/11/2018 7:45 AM Haase, Susan Rachele, APRN [facility]        Today's Diagnoses     Routine general medical examination at a health care facility    -  1    Mild intermittent asthma without complication        Hypertension goal BP (blood pressure) < 140/90        Hyperlipidemia LDL goal <160        Vitamin D deficiency disease          Care Instructions      Preventive Health Recommendations  Female Ages 50 - 64    Yearly exam: See your health care provider every year in order to  o Review health changes.   o Discuss preventive care.    o Review your medicines if your doctor has prescribed any.      Get a Pap test every three years (unless you have an abnormal result and your provider advises testing more often).    If you get Pap tests with HPV test, you only need to test every 5 years, unless you have an abnormal result.     You do not need a Pap test if your uterus was removed (hysterectomy) and you have not had cancer.    You should be tested each year for STDs (sexually transmitted diseases) if you're at risk.     Have a mammogram every 1 to 2 years.    Have a colonoscopy at age 50, or have a yearly FIT test (stool test). These exams screen for colon cancer.      Have a cholesterol test every 5 years, or more often if advised.    Have a diabetes test (fasting glucose) every three years. If you are at risk for diabetes, you should have this test more often.     If you are at risk for osteoporosis (brittle bone disease), think about having a bone density scan (DEXA).    Shots: Get a flu shot each year. Get a tetanus shot every 10 years.    Nutrition:     Eat at least 5 servings of fruits and vegetables each day.    Eat whole-grain bread, whole-wheat pasta and brown rice instead of  white grains and rice.    Talk to your provider about Calcium and Vitamin D.     Lifestyle    Exercise at least 150 minutes a week (30 minutes a day, 5 days a week). This will help you control your weight and prevent disease.    Limit alcohol to one drink per day.    No smoking.     Wear sunscreen to prevent skin cancer.     See your dentist every six months for an exam and cleaning.    See your eye doctor every 1 to 2 years.            Follow-ups after your visit        Additional Services     DERMATOLOGY REFERRAL       Your provider has referred you to: HEATHER: [facility] - [place] [phone]    Please be aware that coverage of these services is subject to the terms and limitations of your health insurance plan.  Call member services at your health plan with any benefit or coverage questions.      Please bring the following with you to your appointment:    (1) Any X-Rays, CTs or MRIs which have been performed.  Contact the facility where they were done to arrange for  prior to your scheduled appointment.    (2) List of current medications  (3) This referral request   (4) Any documents/labs given to you for this referral                  Follow-up notes from your care team     Return in about 1 year (around 6/11/2019) for Physical Exam.      Your next 10 appointments already scheduled     Jun 11, 2018  9:00 AM CDT   [facility] with [facility]   [facility]    [address] [place] [id number] [phone]           Do NOT use body powder, lotions, perfume or deodorant the day of the exam.  If your last mammogram was not done at [facility], please bring your mammogram films. We will need the name of your provider to send a copy of your report.  A mammogram may be covered on an annual or biannual basis, please check with your insurance company.              Who to contact     If you have questions or need follow up  "information about today's clinic visit or your schedule please contact [facility] directly at [phone].  Normal or non-critical lab and imaging results will be communicated to you by [employer], letter or phone within 4 business days after the clinic has received the results. If you do not hear from us within 7 days, please contact the clinic through [employer] or phone. If you have a critical or abnormal lab result, we will notify you by phone as soon as possible.  Submit refill requests through [employer] or call your pharmacy and they will forward the refill request to us. Please allow 3 business days for your refill to be completed.          Additional Information About Your Visit        [employer] Information     [employer] gives you secure access to your electronic health record. If you see a primary care provider, you can also send messages to your care team and make appointments. If you have questions, please call your primary care clinic.  If you do not have a primary care provider, please call [phone] and they will assist you.        Care EveryWhere ID     This is your Care EveryWhere ID. This could be used by other organizations to access your [place] medical records  [id number]        Your Vitals Were     Pulse Temperature Respirations Height Pulse Oximetry BMI (Body Mass Index)    80 98  F (36.7  C) (Oral) 12 5' 4.5\" (1.638 m) 99% 31.43 kg/m2       Blood Pressure from Last 3 Encounters:   06/11/18 136/78   04/18/18 120/80   04/02/18 132/82    Weight from Last 3 Encounters:   06/11/18 186 lb (84.4 kg)   04/18/18 179 lb (81.2 kg)   04/02/18 178 lb (80.7 kg)              We Performed the Following     CBC with platelets differential     Comprehensive metabolic panel     DERMATOLOGY REFERRAL     Hemoglobin A1c     Lipid panel reflex to direct LDL Fasting     TSH with free T4 reflex     Vitamin D Deficiency          Today's Medication Changes          These changes are accurate as of 6/11/18  " 8:25 AM.  If you have any questions, ask your nurse or doctor.               These medicines have changed or have updated prescriptions.        Dose/Directions    lisinopril 10 MG tablet   Commonly known as:  PRINIVIL/ZESTRIL   This may have changed:  See the new instructions.   Used for:  Hypertension goal BP (blood pressure) < 140/90   Changed by:  Haase, Susan Rachele, APRN CNP        Dose:  10 mg   Take 1 tablet (10 mg) by mouth daily   Quantity:  90 tablet   Refills:  1         Stop taking these medicines if you haven't already. Please contact your care team if you have questions.     amoxicillin 875 MG tablet   Commonly known as:  AMOXIL   Stopped by:  Haase, Susan Rachele, APRN CNP                Where to get your medicines      These medications were sent to [place] [address] [place] [facility] [address] [address]     Phone:  [phone]     lisinopril 10 MG tablet                Primary Care Provider Office Phone # Fax #    Susan Rachele Haase, APRN [phone] [address] [facility] [address]        Equal Access to Services     [facility]: Hadii aad ku hadasho Soomaali, waaxda luqadaha, qaybta kaalmada adeegyada, iveth quispe [place] . So [facility] [phone].    ATENCIÓN: Si habla español, tiene a steen disposición servicios gratuitos de asistencia lingüística. [facility] [phone].    We comply with applicable federal civil rights laws and [place] laws. We do not discriminate on the basis of race, color, national origin, age, disability, sex, sexual orientation, or gender identity.            Thank you!     Thank you for choosing [facility]  for your care. Our goal is always to provide you with excellent care. Hearing back from our patients is one way we can continue to improve our services. Please take a few minutes to complete the written survey that you may receive in the mail after your visit  with us. Thank you!             Your Updated Medication List - Protect others around you: Learn how to safely use, store and throw away your medicines at www.disposemymeds.org.          This list is accurate as of 6/11/18  8:25 AM.  Always use your most recent med list.                   Brand Name Dispense Instructions for use Diagnosis    albuterol 108 (90 Base) MCG/ACT Inhaler    PROAIR HFA/PROVENTIL HFA/VENTOLIN HFA    1 Inhaler    Inhale 2 puffs into the lungs every 4 hours as needed for shortness of breath / dyspnea    Mild intermittent asthma, uncomplicated       ALEVE PO           ASPIRIN PO      Take 81 mg by mouth daily        CALCIUM + D3 PO      Take 1 tablet by mouth daily 600mg Calcium + 800iu Vitamin D3        fexofenadine 180 MG tablet    ALLEGRA    90 tablet    Take 1 tablet (180 mg) by mouth daily    Allergic rhinitis, cause unspecified       FLAXSEED OIL PO      Take 1,000 mg by mouth daily        fluticasone 50 MCG/ACT spray    FLONASE    16 mL    Spray 2 sprays into both nostrils daily    Mild intermittent asthma, uncomplicated       lisinopril 10 MG tablet    PRINIVIL/ZESTRIL    90 tablet    Take 1 tablet (10 mg) by mouth daily    Hypertension goal BP (blood pressure) < 140/90       multivitamin, therapeutic Tabs tablet      Take 1 tablet by mouth daily        omega-3 acid ethyl esters 1 g capsule    Lovaza     Take 1 g by mouth daily        Vitamin D-3 Super Strength 2000 units tablet   Generic drug:  cholecalciferol     100 tablet    TAKE 1 TABLET BY MOUTH DAILY    Vitamin D deficiency disease

## 2018-06-11 NOTE — PROGRESS NOTES
Marcin Hartmann,  Your CBC (checks for anemia and infection) is normal.  Sincerely,   Susan Haase, CNP

## 2018-06-12 ENCOUNTER — MYC MEDICAL ADVICE (OUTPATIENT)
Dept: FAMILY MEDICINE | Facility: CLINIC | Age: 58
End: 2018-06-12

## 2018-06-12 LAB
CHOLEST SERPL-MCNC: 219 MG/DL
HDLC SERPL-MCNC: 75 MG/DL
LDLC SERPL CALC-MCNC: 132 MG/DL
NONHDLC SERPL-MCNC: 144 MG/DL
TRIGL SERPL-MCNC: 59 MG/DL
TSH SERPL DL<=0.005 MIU/L-ACNC: 1.65 MU/L (ref 0.4–4)

## 2018-06-12 NOTE — TELEPHONE ENCOUNTER
SH, see [email or web address] message and advise  Inez Diego RN, BSN  Message handled by Nurse Triage.

## 2018-06-12 NOTE — PROGRESS NOTES
Marcin Hartmann,  Your CBC (checks for anemia and infection) was normal.  Your A1C (checks for diabetes) is normal at 5.1%.  Vitamin D is normal at 51.   Sincerely,  Susan Haase, CNP

## 2018-06-13 LAB
ALBUMIN SERPL-MCNC: 4 G/DL (ref 3.4–5)
ALP SERPL-CCNC: 81 U/L (ref 40–150)
ALT SERPL W P-5'-P-CCNC: 18 U/L (ref 0–50)
ANION GAP SERPL CALCULATED.3IONS-SCNC: 8 MMOL/L (ref 3–14)
AST SERPL W P-5'-P-CCNC: 17 U/L (ref 0–45)
BILIRUB SERPL-MCNC: 0.3 MG/DL (ref 0.2–1.3)
BUN SERPL-MCNC: 23 MG/DL (ref 7–30)
CALCIUM SERPL-MCNC: 9.3 MG/DL (ref 8.5–10.1)
CHLORIDE SERPL-SCNC: 107 MMOL/L (ref 94–109)
CO2 SERPL-SCNC: 27 MMOL/L (ref 20–32)
CREAT SERPL-MCNC: 0.67 MG/DL (ref 0.52–1.04)
GFR SERPL CREATININE-BSD FRML MDRD: 89 ML/MIN/1.7M2
GLUCOSE SERPL-MCNC: 87 MG/DL (ref 70–99)
POTASSIUM SERPL-SCNC: 4 MMOL/L (ref 3.4–5.3)
PROT SERPL-MCNC: 7.4 G/DL (ref 6.8–8.8)
SODIUM SERPL-SCNC: 142 MMOL/L (ref 133–144)

## 2018-06-13 NOTE — PROGRESS NOTES
Marcin Hartmann,  Your lab results are as below:  1)  TSH (thyroid level) 1.65 which is normal (range 0.4-4)  2)  Cholesterol was elevated at 219, your LDL (bad cholesterol) and your HDL (good cholesterol) were within normal range. Continue to follow a low cholesterol diet and we will recheck this in 1 year.    If you have any questions do not hesitate to call the clinic to discuss the results with me further.     Sincerely,    Susan Haase, CNP

## 2018-06-13 NOTE — PROGRESS NOTES
Marcin Hartmann,  Your electrolytes (checks glucose, kidney and liver function) are normal.  Sincerely,     Susan Haase, CNP

## 2018-07-03 ENCOUNTER — TELEPHONE (OUTPATIENT)
Dept: FAMILY MEDICINE | Facility: CLINIC | Age: 58
End: 2018-07-03
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
## 2018-07-03 RX ORDER — DIAPER,BRIEF,INFANT-TODD,DISP
EACH MISCELLANEOUS
Qty: 60 G | Refills: 0 | Status: SHIPPED | OUTPATIENT
Start: 2018-07-03 | End: 2018-07-03

## 2018-07-03 RX ORDER — TRIAMCINOLONE ACETONIDE 1 MG/G
OINTMENT TOPICAL
Qty: 80 G | Refills: 0 | Status: SHIPPED | OUTPATIENT
Start: 2018-07-03

## 2018-07-03 NOTE — TELEPHONE ENCOUNTER
Prescription placed for hydrocortisone 1% ointment, if this isn't covered or is very expensive would recommend OTC hydrocortisone ointment.  Thanks,  Susan Haase, CNP

## 2018-07-03 NOTE — TELEPHONE ENCOUNTER
Per [facility] pharmacist.    Recommended possible affordable alternatives would be:    Hydrocortisone 2.5% in ointment or cream ( May cost about $4.00) or Triamcinolone  0.1% in an ointment or cream as well    Please advise if either one of these are appropriate.    Thank you,    Sheryl ETIENNE RN, BSN, PHN  [facility] [employer] RN

## 2018-07-03 NOTE — TELEPHONE ENCOUNTER
Pt. Informed but stated she had already tried the OTC Hydrocortisone and that is why there was a suggestion for the stronger cream    Triage to call pharmacist to see what there recommended alternatives would be.    Sheryl ETIENNE RN, BSN, PHN  [place] [employer] RN

## 2018-07-03 NOTE — TELEPHONE ENCOUNTER
Pt calling into clinic       Disp Refills Start End TISHA   hydrocortisone valerate (WEST-HARINDER) 0.2 % ointment 60 g 0 6/11/2018  --   Sig: Apply sparingly to affected area three times daily as needed.     Cream was ordered, but was too expensive $180    Wondering if there is a less expensive option    Contact pt with alternative if ordered  # [phone]  OK to     Route to provider to review and advise    Adore Jones RN Nurse Triage

## 2018-07-24 ENCOUNTER — OFFICE VISIT (OUTPATIENT)
Dept: DERMATOLOGY | Facility: CLINIC | Age: 58
End: 2018-07-24
Attending: NURSE PRACTITIONER
Payer: COMMERCIAL

## 2018-07-24 DIAGNOSIS — L82.1 SEBORRHEIC KERATOSIS: ICD-10-CM

## 2018-07-24 DIAGNOSIS — D48.5 NEOPLASM OF UNCERTAIN BEHAVIOR OF SKIN: Primary | ICD-10-CM

## 2018-07-24 PROCEDURE — 11100 HC BIOPSY SKIN/SUBQ/MUC MEM, SINGLE LESION: CPT | Performed by: DERMATOLOGY

## 2018-07-24 PROCEDURE — 88305 TISSUE EXAM BY PATHOLOGIST: CPT | Mod: TC | Performed by: DERMATOLOGY

## 2018-07-24 PROCEDURE — 99203 OFFICE O/P NEW LOW 30 MIN: CPT | Mod: 25 | Performed by: DERMATOLOGY

## 2018-07-24 RX ORDER — LIDOCAINE HYDROCHLORIDE AND EPINEPHRINE 10; 10 MG/ML; UG/ML
3 INJECTION, SOLUTION INFILTRATION; PERINEURAL ONCE
Qty: 3 ML | Refills: 0 | OUTPATIENT
Start: 2018-07-24 | End: 2018-07-24

## 2018-07-24 NOTE — LETTER
7/24/2018      RE: Mary Kate Pacheco  [address]  [place] [address]               Dermatology Clinic Visit Note    DPL:  1. Seborrheic keratoses  2. Neoplasm of uncertain behavior on the R thigh, biopsy on 7/24/18      Service Date: 07/24/2018      CHIEF COMPLAINT:  Changing spot on leg.      HISTORY OF PRESENT ILLNESS:  Ms. Pacheco is a 58-year-old female presenting to Dermatology Clinic seen at the request of Dr. Weeks for initial evaluation of a changing spot on her right thigh present for several months.  She notes that initially the lesion had been scaly and then the scaling resolved and a red patch was left in its place.  She denies any past history of skin cancer or dysplastic nevi.  No bleeding or painful areas.      Patient Active Problem List   Diagnosis     Allergic rhinitis     Premature menopause     iamLUMBAGO     Acute pancreatitis     Female stress incontinence     Mild intermittent asthma     Hyperlipidemia LDL goal <160     Obesity     Hypertension goal BP (blood pressure) < 140/90     Vitamin D deficiency disease       Allergies   Allergen Reactions     Dust Mites      itchy runny nose sneezing     Ibuprofen      cancer sores with high doses     Mold      itchy runny nose sneezing     Seasonal Allergies      itchy runny nose sneezing         Current Outpatient Prescriptions   Medication     albuterol (PROAIR HFA/PROVENTIL HFA/VENTOLIN HFA) 108 (90 Base) MCG/ACT Inhaler     ASPIRIN PO     Calcium Carb-Cholecalciferol (CALCIUM + D3 PO)     fexofenadine (ALLEGRA) 180 MG tablet     Flaxseed, Linseed, (FLAXSEED OIL PO)     fluticasone (FLONASE) 50 MCG/ACT spray     Glucosamine HCl 1000 MG TABS     lisinopril (PRINIVIL/ZESTRIL) 10 MG tablet     multivitamin, therapeutic (THERA-VIT) TABS tablet     Naproxen Sodium (ALEVE PO)     omega-3 acid ethyl esters (LOVAZA) 1 G capsule     triamcinolone (KENALOG) 0.1 % ointment     VITAMIN D-3 SUPER STRENGTH 2000 UNITS tablet     No current  facility-administered medications for this visit.           SOCIAL HISTORY:  The patient is .  She lives in [place].  She has grandchildren in the area.      REVIEW OF SYSTEMS:  Feels well without other skin concerns today.      PHYSICAL EXAMINATION:   GENERAL:  The patient is a healthy-appearing 58-year-old female in no distress.   HEENT:  Conjunctivae clear.   PULMONARY:  Breathing comfortably on room air.   ABDOMEN:  No abdominal distention.   SKIN:  Examination today included the scalp, face, neck, chest, abdomen, back, arms, legs, hands, feet and buttocks.  Skin exam was normal except for as follows:   - Examination of the right upper cutaneous lip with an acneiform papule.   - Punctate 1 mm erosion on the right central cheek.   - Scattered light-brown, evenly pigmented macules on the lateral cheeks, upper back and chest.   - Circular light brown, slightly raised, scaling plaques on the back, thighs, upper arms shins.   - Pink-brown approximately 1-1/2 oval, slightly raised plaque on right lateral thigh without overlying scale and mild induration.      PROCEDURE NOTE:  The patient consented to a shave biopsy of the lesion on the right thigh.  The area was infiltrated with 1 mL of lidocaine with epinephrine.  A DermaBlade was used to perform shave biopsy.  Aluminum chloride, petrolatum and a bandage applied.      ASSESSMENT AND PLAN:   1.  Neoplasm of uncertain behavior on right lateral thigh; differential diagnosis would include benign lichenoid keratosis, traumatized seborrheic keratosis, less likely basal or squamous cell carcinoma.  Biopsy performed today.  We will contact the patient with the results.   2.  Seborrheic keratoses, benign, hyperkeratotic papules and plaques.  No treatment advised.   3.  Acneiform lesion on the left upper cutaneous lip.  Advised patient to contact me should the lesion fail to resolve.      Ms. Pacheco to return to clinic as needed, sooner pending results of biopsy.       Thank you for this consultation.     Arlene Rausch MD  Dermatology Staff       cc:   Susan Haase, APRN, CNP   [facility]   [address] [place]  [address]             D: 2018   T: 2018   MT: [email or web address]      Name:     IBETH CORRAL   MRN:      [id number]        Account:      [id number]   :      1960           Service Date: 2018      Document: [id number]

## 2018-07-24 NOTE — PATIENT INSTRUCTIONS
Pediatric Dermatology  [facility]  [address]  1st [place]  [address]  Phone: [phone]    Pediatric & Adult Dermatology  [facility]  [address] [facility]   2nd Floor  [facility] [address]  [phone]                  General information: Dr. Arlene Rausch is a board-certified dermatologist with subspecialty certification in pediatric dermatology.     Scheduling and Nurse Triage: Dr. Rausch sees pediatric patients on Mondays in [place] and adult and pediatric patients on Tuesdays in [place]. The remainder of the week she practices at the [facility]. Please call the above phone numbers to schedule or to talk to a nurse.     -For scheduling at the [place] or [place] locations, or to talk to the triage nurse please call the above phone number at the clinic where you were seen.     -For medication refills, please call your pharmacy.

## 2018-07-24 NOTE — MR AVS SNAPSHOT
After Visit Summary   7/24/2018    Mary Kate Pacheco    MRN: [id number]           Patient Information     Date Of Birth          1960        Visit Information        Provider Department      7/24/2018 3:15 PM Arlene Rausch MD [facility]        Today's Diagnoses     Neoplasm of uncertain behavior of skin    -  1    Seborrheic keratosis          Care Instructions                    Pediatric Dermatology  [facility]  [address]  1st [place]  [address]  Phone: [phone]    Pediatric & Adult Dermatology  [facility]  [address]  2nd Floor  [facility] [address]  [phone]                  General information: Dr. Arlene Rausch is a board-certified dermatologist with subspecialty certification in pediatric dermatology.     Scheduling and Nurse Triage: Dr. Rausch sees pediatric patients on Mondays in [place] and adult and pediatric patients on Tuesdays in [place]. The remainder of the week she practices at the [facility]. Please call the above phone numbers to schedule or to talk to a nurse.     -For scheduling at the [place] or [place] locations, or to talk to the triage nurse please call the above phone number at the clinic where you were seen.     -For medication refills, please call your pharmacy.                     Follow-ups after your visit        Who to contact     If you have questions or need follow up information about today's clinic visit or your schedule please contact [facility] directly at [phone].  Normal or non-critical lab and imaging results will be communicated to you by MyChart, letter or phone within 4 business days after the clinic has received the results. If you do not hear from us within 7 days, please contact the clinic through MyChart or phone. If you have a critical or abnormal lab result,  we will notify you by phone as soon as possible.  Submit refill requests through GeoVario or call your pharmacy and they will forward the refill request to us. Please allow 3 business days for your refill to be completed.          Additional Information About Your Visit        [employer] Material[employer]art Information     GeoVario gives you secure access to your electronic health record. If you see a primary care provider, you can also send messages to your care team and make appointments. If you have questions, please call your primary care clinic.  If you do not have a primary care provider, please call [phone] and they will assist you.        Care EveryWhere ID     This is your Care EveryWhere ID. This could be used by other organizations to access your [facility] medical records  [id number]         Blood Pressure from Last 3 Encounters:   06/11/18 136/78   04/18/18 120/80   04/02/18 132/82    Weight from Last 3 Encounters:   06/11/18 186 lb (84.4 kg)   04/18/18 179 lb (81.2 kg)   04/02/18 178 lb (80.7 kg)              We Performed the Following     BIOPSY SKIN/SUBQ/MUC MEM, SINGLE LESION     Dermatological path order and indications          Today's Medication Changes          These changes are accurate as of 7/24/18 11:59 PM.  If you have any questions, ask your nurse or doctor.               Start taking these medicines.        Dose/Directions    lidocaine 1% with EPINEPHrine 1:100,000 1 %-1:811496 injection   Used for:  Neoplasm of uncertain behavior of skin   Started by:  Arlene Rausch MD        Dose:  3 mL   Inject 3 mLs into the skin once for 1 dose   Quantity:  3 mL   Refills:  0         These medicines have changed or have updated prescriptions.        Dose/Directions    triamcinolone 0.1 % ointment   Commonly known as:  KENALOG   This may have changed:    - when to take this  - reasons to take this  - additional instructions   Used for:  Bug bite, sequela        Apply sparingly to affected area three times daily  for 14 days.   Quantity:  80 g   Refills:  0            Where to get your medicines      Some of these will need a paper prescription and others can be bought over the counter.  Ask your nurse if you have questions.     You don't need a prescription for these medications     lidocaine 1% with EPINEPHrine 1:100,000 1 %-1:200921 injection                Primary Care Provider Office Phone # Fax #    Susan Rachele Haase, APRN -[phone]       [address] [facility] [address]        Equal Access to Services     LESTER KING : Hadii aad ku hadasho Soomaali, waaxda luqadaha, qaybta kaalmada adeegyada, waxay aravindin [place] . So [facility] [phone].    ATENCIÓN: Si habla español, tiene a steen disposición servicios gratuitos de asistencia lingüística. [facility] [phone].    We comply with applicable federal civil rights laws and [place] laws. We do not discriminate on the basis of race, color, national origin, age, disability, sex, sexual orientation, or gender identity.            Thank you!     Thank you for choosing [facility]  for your care. Our goal is always to provide you with excellent care. Hearing back from our patients is one way we can continue to improve our services. Please take a few minutes to complete the written survey that you may receive in the mail after your visit with us. Thank you!             Your Updated Medication List - Protect others around you: Learn how to safely use, store and throw away your medicines at www.disposemymeds.org.          This list is accurate as of 7/24/18 11:59 PM.  Always use your most recent med list.                   Brand Name Dispense Instructions for use Diagnosis    albuterol 108 (90 Base) MCG/ACT Inhaler    PROAIR HFA/PROVENTIL HFA/VENTOLIN HFA    1 Inhaler    Inhale 2 puffs into the lungs every 4 hours as needed for shortness of breath / dyspnea    Mild intermittent asthma, uncomplicated       ALEVE PO            ASPIRIN PO      Take 81 mg by mouth daily        CALCIUM + D3 PO      Take 1 tablet by mouth daily 600mg Calcium + 800iu Vitamin D3        fexofenadine 180 MG tablet    ALLEGRA    90 tablet    Take 1 tablet (180 mg) by mouth daily    Allergic rhinitis, cause unspecified       FLAXSEED OIL PO      Take 1,000 mg by mouth daily        fluticasone 50 MCG/ACT spray    FLONASE    16 mL    Spray 2 sprays into both nostrils daily    Mild intermittent asthma, uncomplicated       Glucosamine HCl 1000 MG Tabs           lidocaine 1% with EPINEPHrine 1:100,000 1 %-1:092712 injection     3 mL    Inject 3 mLs into the skin once for 1 dose    Neoplasm of uncertain behavior of skin       lisinopril 10 MG tablet    PRINIVIL/ZESTRIL    90 tablet    Take 1 tablet (10 mg) by mouth daily    Hypertension goal BP (blood pressure) < 140/90       multivitamin, therapeutic Tabs tablet      Take 1 tablet by mouth daily        omega-3 acid ethyl esters 1 g capsule    Lovaza     Take 1 g by mouth daily        triamcinolone 0.1 % ointment    KENALOG    80 g    Apply sparingly to affected area three times daily for 14 days.    Bug bite, sequela       Vitamin D-3 Super Strength 2000 units tablet   Generic drug:  cholecalciferol     100 tablet    TAKE 1 TABLET BY MOUTH DAILY    Vitamin D deficiency disease

## 2018-07-25 NOTE — PROGRESS NOTES
Dermatology Clinic Visit Note    DPL:  1. Seborrheic keratoses  2. Neoplasm of uncertain behavior on the R thigh, biopsy on 7/24/18      Service Date: 07/24/2018      CHIEF COMPLAINT:  Changing spot on leg.      HISTORY OF PRESENT ILLNESS:  Ms. Pacheco is a 58-year-old female presenting to Dermatology Clinic seen at the request of Dr. Weeks for initial evaluation of a changing spot on her right thigh present for several months.  She notes that initially the lesion had been scaly and then the scaling resolved and a red patch was left in its place.  She denies any past history of skin cancer or dysplastic nevi.  No bleeding or painful areas.      Patient Active Problem List   Diagnosis     Allergic rhinitis     Premature menopause     iamLUMBAGO     Acute pancreatitis     Female stress incontinence     Mild intermittent asthma     Hyperlipidemia LDL goal <160     Obesity     Hypertension goal BP (blood pressure) < 140/90     Vitamin D deficiency disease       Allergies   Allergen Reactions     Dust Mites      itchy runny nose sneezing     Ibuprofen      cancer sores with high doses     Mold      itchy runny nose sneezing     Seasonal Allergies      itchy runny nose sneezing         Current Outpatient Prescriptions   Medication     albuterol (PROAIR HFA/PROVENTIL HFA/VENTOLIN HFA) 108 (90 Base) MCG/ACT Inhaler     ASPIRIN PO     Calcium Carb-Cholecalciferol (CALCIUM + D3 PO)     fexofenadine (ALLEGRA) 180 MG tablet     Flaxseed, Linseed, (FLAXSEED OIL PO)     fluticasone (FLONASE) 50 MCG/ACT spray     Glucosamine HCl 1000 MG TABS     lisinopril (PRINIVIL/ZESTRIL) 10 MG tablet     multivitamin, therapeutic (THERA-VIT) TABS tablet     Naproxen Sodium (ALEVE PO)     omega-3 acid ethyl esters (LOVAZA) 1 G capsule     triamcinolone (KENALOG) 0.1 % ointment     VITAMIN D-3 SUPER STRENGTH 2000 UNITS tablet     No current facility-administered medications for this visit.           SOCIAL HISTORY:  The patient is .  She  lives in [place].  She has grandchildren in the area.      REVIEW OF SYSTEMS:  Feels well without other skin concerns today.      PHYSICAL EXAMINATION:   GENERAL:  The patient is a healthy-appearing 58-year-old female in no distress.   HEENT:  Conjunctivae clear.   PULMONARY:  Breathing comfortably on room air.   ABDOMEN:  No abdominal distention.   SKIN:  Examination today included the scalp, face, neck, chest, abdomen, back, arms, legs, hands, feet and buttocks.  Skin exam was normal except for as follows:   - Examination of the right upper cutaneous lip with an acneiform papule.   - Punctate 1 mm erosion on the right central cheek.   - Scattered light-brown, evenly pigmented macules on the lateral cheeks, upper back and chest.   - Circular light brown, slightly raised, scaling plaques on the back, thighs, upper arms shins.   - Pink-brown approximately 1-1/2 oval, slightly raised plaque on right lateral thigh without overlying scale and mild induration.      PROCEDURE NOTE:  The patient consented to a shave biopsy of the lesion on the right thigh.  The area was infiltrated with 1 mL of lidocaine with epinephrine.  A DermaBlade was used to perform shave biopsy.  Aluminum chloride, petrolatum and a bandage applied.      ASSESSMENT AND PLAN:   1.  Neoplasm of uncertain behavior on right lateral thigh; differential diagnosis would include benign lichenoid keratosis, traumatized seborrheic keratosis, less likely basal or squamous cell carcinoma.  Biopsy performed today.  We will contact the patient with the results.   2.  Seborrheic keratoses, benign, hyperkeratotic papules and plaques.  No treatment advised.   3.  Acneiform lesion on the left upper cutaneous lip.  Advised patient to contact me should the lesion fail to resolve.      Ms. Pacheco to return to clinic as needed, sooner pending results of biopsy.      Thank you for this consultation.     Arlene Rausch MD  Dermatology Staff       cc:   Susan Haase, APRN,  [facility]   [address] [place]  [address]         LOBO COLINDRES MD             D: 2018   T: 2018   MT: srikanth      Name:     IBETH CORRAL   MRN:      [id number]        Account:      [id number]   :      1960           Service Date: 2018      Document: [id number]

## 2018-07-30 LAB — COPATH REPORT: NORMAL

## 2018-08-06 ENCOUNTER — OFFICE VISIT (OUTPATIENT)
Dept: URGENT CARE | Facility: URGENT CARE | Age: 58
End: 2018-08-06
Payer: COMMERCIAL

## 2018-08-06 VITALS — TEMPERATURE: 98.3 F | OXYGEN SATURATION: 97 % | WEIGHT: 185 LBS | HEART RATE: 79 BPM | BODY MASS INDEX: 31.26 KG/M2

## 2018-08-06 DIAGNOSIS — H10.32 ACUTE BACTERIAL CONJUNCTIVITIS OF LEFT EYE: Primary | ICD-10-CM

## 2018-08-06 PROCEDURE — 99213 OFFICE O/P EST LOW 20 MIN: CPT | Performed by: PHYSICIAN ASSISTANT

## 2018-08-06 RX ORDER — POLYMYXIN B SULFATE AND TRIMETHOPRIM 1; 10000 MG/ML; [USP'U]/ML
1 SOLUTION OPHTHALMIC
Qty: 1 BOTTLE | Refills: 0 | Status: SHIPPED | OUTPATIENT
Start: 2018-08-06 | End: 2018-08-13

## 2018-08-06 NOTE — MR AVS SNAPSHOT
After Visit Summary   8/6/2018    Mary Kate Pacheco    MRN: [id number]           Patient Information     Date Of Birth          1960        Visit Information        Provider Department      8/6/2018 7:00 PM Rani Morgan PA-C [facility]        Today's Diagnoses     Acute bacterial conjunctivitis of left eye    -  1       Follow-ups after your visit        Who to contact     If you have questions or need follow up information about today's clinic visit or your schedule please contact [facility] directly at [phone].  Normal or non-critical lab and imaging results will be communicated to you by Mixifyhart, letter or phone within 4 business days after the clinic has received the results. If you do not hear from us within 7 days, please contact the clinic through Mixifyhart or phone. If you have a critical or abnormal lab result, we will notify you by phone as soon as possible.  Submit refill requests through Portafare or call your pharmacy and they will forward the refill request to us. Please allow 3 business days for your refill to be completed.          Additional Information About Your Visit        MyChart Information     Portafare gives you secure access to your electronic health record. If you see a primary care provider, you can also send messages to your care team and make appointments. If you have questions, please call your primary care clinic.  If you do not have a primary care provider, please call [phone] and they will assist you.        Care EveryWhere ID     This is your Care EveryWhere ID. This could be used by other organizations to access your [facility] medical records  [id number]        Your Vitals Were     Pulse Temperature Pulse Oximetry BMI (Body Mass Index)          79 98.3  F (36.8  C) (Tympanic) 97% 31.26 kg/m2         Blood Pressure from Last 3 Encounters:   06/11/18 136/78   04/18/18 120/80   04/02/18 132/82    Weight from Last 3  Encounters:   08/06/18 185 lb (83.9 kg)   06/11/18 186 lb (84.4 kg)   04/18/18 179 lb (81.2 kg)              Today, you had the following     No orders found for display         Today's Medication Changes          These changes are accurate as of 8/6/18  7:33 PM.  If you have any questions, ask your nurse or doctor.               Start taking these medicines.        Dose/Directions    trimethoprim-polymyxin b ophthalmic solution   Commonly known as:  POLYTRIM   Used for:  Acute bacterial conjunctivitis of left eye   Started by:  Rani Morgan PA-C        Dose:  1 drop   Apply 1 drop to eye every 3 hours for 7 days   Quantity:  1 Bottle   Refills:  0         These medicines have changed or have updated prescriptions.        Dose/Directions    triamcinolone 0.1 % ointment   Commonly known as:  KENALOG   This may have changed:    - when to take this  - reasons to take this  - additional instructions   Used for:  Bug bite, sequela        Apply sparingly to affected area three times daily for 14 days.   Quantity:  80 g   Refills:  0            Where to get your medicines      These medications were sent to [place] [address] [place] [facility] [address] [facility]  [address] [facility] [address]     Phone:  [phone]     trimethoprim-polymyxin b ophthalmic solution                Primary Care Provider Office Phone # Fax #    Susan Rachele Haase, APRN -[phone] [address] [facility] [address]        Equal Access to Services     LESTER KING AH: Hadii lesley roque hadasho Sokayali, waaxda luqadaha, qaybta kaalmada adeegyada, iveth shelby. So [facility] [phone].    ATENCIÓN: Si habla español, tiene a steen disposición servicios gratuitos de asistencia lingüística. Llame al [phone].    We comply with applicable federal civil rights laws and [place] laws. We do not discriminate on the basis of race, color, national origin, age, disability, sex, sexual  orientation, or gender identity.            Thank you!     Thank you for choosing [facility]  for your care. Our goal is always to provide you with excellent care. Hearing back from our patients is one way we can continue to improve our services. Please take a few minutes to complete the written survey that you may receive in the mail after your visit with us. Thank you!             Your Updated Medication List - Protect others around you: Learn how to safely use, store and throw away your medicines at www.disposemymeds.org.          This list is accurate as of 8/6/18  7:33 PM.  Always use your most recent med list.                   Brand Name Dispense Instructions for use Diagnosis    albuterol 108 (90 Base) MCG/ACT Inhaler    PROAIR HFA/PROVENTIL HFA/VENTOLIN HFA    1 Inhaler    Inhale 2 puffs into the lungs every 4 hours as needed for shortness of breath / dyspnea    Mild intermittent asthma, uncomplicated       ALEVE PO           ASPIRIN PO      Take 81 mg by mouth daily        CALCIUM + D3 PO      Take 1 tablet by mouth daily 600mg Calcium + 800iu Vitamin D3        fexofenadine 180 MG tablet    ALLEGRA    90 tablet    Take 1 tablet (180 mg) by mouth daily    Allergic rhinitis, cause unspecified       FLAXSEED OIL PO      Take 1,000 mg by mouth daily        fluticasone 50 MCG/ACT spray    FLONASE    16 mL    Spray 2 sprays into both nostrils daily    Mild intermittent asthma, uncomplicated       Glucosamine HCl 1000 MG Tabs           lisinopril 10 MG tablet    PRINIVIL/ZESTRIL    90 tablet    Take 1 tablet (10 mg) by mouth daily    Hypertension goal BP (blood pressure) < 140/90       multivitamin, therapeutic Tabs tablet      Take 1 tablet by mouth daily        omega-3 acid ethyl esters 1 g capsule    Lovaza     Take 1 g by mouth daily        triamcinolone 0.1 % ointment    KENALOG    80 g    Apply sparingly to affected area three times daily for 14 days.    Bug bite, sequela        trimethoprim-polymyxin b ophthalmic solution    POLYTRIM    1 Bottle    Apply 1 drop to eye every 3 hours for 7 days    Acute bacterial conjunctivitis of left eye       Vitamin D-3 Super Strength 2000 units tablet   Generic drug:  cholecalciferol     100 tablet    TAKE 1 TABLET BY MOUTH DAILY    Vitamin D deficiency disease

## 2018-08-07 NOTE — PROGRESS NOTES
SUBJECTIVE:  Chief Complaint:   Chief Complaint   Patient presents with     Urgent Care     Conjunctivitis     x 2 days      History of Present Illness:  Mary Kate Pacheco is a 58 year old female who presents complaining of moderate left eye discharge, mattering for 2 day(s).   Onset/timing: sudden.    Associated Signs and Symptoms: mild rhinorrhea  Treatment measures tried include: warm packs  Contact wearer : No    Past Medical History:   Diagnosis Date     Acute pancreatitis 7/2006     Allergic rhinitis, cause unspecified      Asthma      Cholecystitis, unspecified 7/2006     Hypertension      Mild intermittent asthma     trigger spring/fall allergies     Other and unspecified disc disorder of lumbar region      PONV (postoperative nausea and vomiting)      Symptomatic states associated with artificial menopause      Current Outpatient Prescriptions   Medication Sig Dispense Refill     albuterol (PROAIR HFA/PROVENTIL HFA/VENTOLIN HFA) 108 (90 Base) MCG/ACT Inhaler Inhale 2 puffs into the lungs every 4 hours as needed for shortness of breath / dyspnea 1 Inhaler 11     ASPIRIN PO Take 81 mg by mouth daily       Calcium Carb-Cholecalciferol (CALCIUM + D3 PO) Take 1 tablet by mouth daily 600mg Calcium + 800iu Vitamin D3       fexofenadine (ALLEGRA) 180 MG tablet Take 1 tablet (180 mg) by mouth daily 90 tablet 3     Flaxseed, Linseed, (FLAXSEED OIL PO) Take 1,000 mg by mouth daily       fluticasone (FLONASE) 50 MCG/ACT spray Spray 2 sprays into both nostrils daily 16 mL 11     Glucosamine HCl 1000 MG TABS        lisinopril (PRINIVIL/ZESTRIL) 10 MG tablet Take 1 tablet (10 mg) by mouth daily 90 tablet 1     multivitamin, therapeutic (THERA-VIT) TABS tablet Take 1 tablet by mouth daily       Naproxen Sodium (ALEVE PO)        omega-3 acid ethyl esters (LOVAZA) 1 G capsule Take 1 g by mouth daily       triamcinolone (KENALOG) 0.1 % ointment Apply sparingly to affected area three times daily for 14 days. (Patient taking  differently: as needed Apply sparingly to affected area three times daily for 14 days.) 80 g 0     VITAMIN D-3 SUPER STRENGTH 2000 UNITS tablet TAKE 1 TABLET BY MOUTH DAILY 100 tablet 3        ROS:  Review of systems negative except as stated above.    OBJECTIVE:  Pulse 79  Temp 98.3  F (36.8  C) (Tympanic)  Wt 185 lb (83.9 kg)  SpO2 97%  BMI 31.26 kg/m2  General: no acute distress  Eye exam: right eye normal lid, conjunctiva, cornea, pupil and fundus, left eye abnormal findings: conjunctivitis with erythema.  Neck: supple, non-tender, free range of motion, no adenopathy  Heart: NORMAL - regular rate and rhythm without murmur.  Lungs: normal and clear to auscultation    ASSESSMENT / PLAN:  1. Acute bacterial conjunctivitis of left eye  Most likely viral pink eye at this time, patient's symptoms consistent with viral. High incidence of bacterial coinfection.Practice good hand hygiene, wash all pillowcases and towels. Spoke about contagious nature of pink eye.    - trimethoprim-polymyxin b (POLYTRIM) ophthalmic solution; Apply 1 drop to eye every 3 hours for 7 days  Dispense: 1 Bottle; Refill: 0    Rani Morgan PA-C

## 2018-10-09 ENCOUNTER — E-VISIT (OUTPATIENT)
Dept: FAMILY MEDICINE | Facility: CLINIC | Age: 58
End: 2018-10-09
Payer: COMMERCIAL

## 2018-10-09 DIAGNOSIS — J34.89 SINUS PRESSURE: Primary | ICD-10-CM

## 2018-10-09 PROCEDURE — 99207 ZZC NO BILLABLE SERVICE THIS VISIT: CPT | Performed by: NURSE PRACTITIONER

## 2019-02-26 DIAGNOSIS — I10 HYPERTENSION GOAL BP (BLOOD PRESSURE) < 140/90: ICD-10-CM

## 2019-02-26 NOTE — TELEPHONE ENCOUNTER
"Last Written Prescription Date:  6/11/18  Last Fill Quantity: 90 tablet,  # refills: 1   Last office visit: 6/11/2018 with prescribing provider:  Haase   Future Office Visit:      Requested Prescriptions   Pending Prescriptions Disp Refills     lisinopril (PRINIVIL/ZESTRIL) 10 MG tablet [Pharmacy Med Name: LISINOPRIL 10 MG TABLET] 90 tablet 1     Sig: TAKE 1 TABLET BY MOUTH EVERY DAY    ACE Inhibitors (Including Combos) Protocol Passed - 2/26/2019  1:24 AM       Passed - Blood pressure under 140/90 in past 12 months    BP Readings from Last 3 Encounters:   06/11/18 136/78   04/18/18 120/80   04/02/18 132/82                Passed - Recent (12 mo) or future (30 days) visit within the authorizing provider's specialty    Patient had office visit in the last 12 months or has a visit in the next 30 days with authorizing provider or within the authorizing provider's specialty.  See \"Patient Info\" tab in inbasket, or \"Choose Columns\" in Meds & Orders section of the refill encounter.             Passed - Medication is active on med list       Passed - Patient is age 18 or older       Passed - No active pregnancy on record       Passed - Normal serum creatinine on file in past 12 months    Recent Labs   Lab Test 06/11/18  0830   CR 0.67            Passed - Normal serum potassium on file in past 12 months    Recent Labs   Lab Test 06/11/18  0830   POTASSIUM 4.0            Passed - No positive pregnancy test within past 12 months          "

## 2019-02-27 RX ORDER — LISINOPRIL 10 MG/1
TABLET ORAL
Qty: 90 TABLET | Refills: 0 | Status: SHIPPED | OUTPATIENT
Start: 2019-02-27 | End: 2019-07-08

## 2019-02-27 NOTE — TELEPHONE ENCOUNTER
Return in about 1 year (around 6/11/2019) for Physical Exam.   Prescription approved per [facility] Refill Protocol.   Inez Diego RN, BSN

## 2019-06-12 ENCOUNTER — OFFICE VISIT (OUTPATIENT)
Dept: URGENT CARE | Facility: URGENT CARE | Age: 59
End: 2019-06-12
Payer: COMMERCIAL

## 2019-06-12 VITALS
RESPIRATION RATE: 20 BRPM | OXYGEN SATURATION: 98 % | TEMPERATURE: 98.1 F | SYSTOLIC BLOOD PRESSURE: 140 MMHG | BODY MASS INDEX: 31.26 KG/M2 | DIASTOLIC BLOOD PRESSURE: 80 MMHG | WEIGHT: 185 LBS | HEART RATE: 100 BPM

## 2019-06-12 DIAGNOSIS — J20.9 ACUTE BRONCHITIS WITH SYMPTOMS > 10 DAYS: Primary | ICD-10-CM

## 2019-06-12 PROCEDURE — 99213 OFFICE O/P EST LOW 20 MIN: CPT | Performed by: PHYSICIAN ASSISTANT

## 2019-06-12 RX ORDER — ALBUTEROL SULFATE 90 UG/1
1-2 AEROSOL, METERED RESPIRATORY (INHALATION) EVERY 4 HOURS PRN
Qty: 8.5 G | Refills: 0 | Status: SHIPPED | OUTPATIENT
Start: 2019-06-12

## 2019-06-12 NOTE — PROGRESS NOTES
SUBJECTIVE:  Mary Kate Pacheco is a 58 year old female who presents to the clinic today with a chief complaint of cough , shortness of breath. and wheezing. for 2 week(s).  Her cough is described as persistent, nonproductive and wheezing.    The patient's symptoms are moderate and stable.  Associated symptoms include none. The patient's symptoms are exacerbated by lying down  Patient has been using nothing to improve symptoms.    Past Medical History:   Diagnosis Date     Acute pancreatitis 7/2006     Allergic rhinitis, cause unspecified      Asthma      Cholecystitis, unspecified 7/2006     Hypertension      Mild intermittent asthma     trigger spring/fall allergies     Other and unspecified disc disorder of lumbar region      PONV (postoperative nausea and vomiting)      Symptomatic states associated with artificial menopause        Current Outpatient Medications   Medication Sig Dispense Refill     albuterol (PROAIR HFA/PROVENTIL HFA/VENTOLIN HFA) 108 (90 Base) MCG/ACT Inhaler Inhale 2 puffs into the lungs every 4 hours as needed for shortness of breath / dyspnea 1 Inhaler 11     ASPIRIN PO Take 81 mg by mouth daily       Calcium Carb-Cholecalciferol (CALCIUM + D3 PO) Take 1 tablet by mouth daily 600mg Calcium + 800iu Vitamin D3       fexofenadine (ALLEGRA) 180 MG tablet Take 1 tablet (180 mg) by mouth daily 90 tablet 3     Flaxseed, Linseed, (FLAXSEED OIL PO) Take 1,000 mg by mouth daily       fluticasone (FLONASE) 50 MCG/ACT spray Spray 2 sprays into both nostrils daily 16 mL 11     Glucosamine HCl 1000 MG TABS        lisinopril (PRINIVIL/ZESTRIL) 10 MG tablet TAKE 1 TABLET BY MOUTH EVERY DAY 90 tablet 0     multivitamin, therapeutic (THERA-VIT) TABS tablet Take 1 tablet by mouth daily       Naproxen Sodium (ALEVE PO)        omega-3 acid ethyl esters (LOVAZA) 1 G capsule Take 1 g by mouth daily       triamcinolone (KENALOG) 0.1 % ointment Apply sparingly to affected area three times daily for 14 days. (Patient  taking differently: as needed Apply sparingly to affected area three times daily for 14 days.) 80 g 0     VITAMIN D-3 SUPER STRENGTH 2000 UNITS tablet TAKE 1 TABLET BY MOUTH DAILY 100 tablet 3       Social History     Tobacco Use     Smoking status: Never Smoker     Smokeless tobacco: Never Used   Substance Use Topics     Alcohol use: Yes     Alcohol/week: 0.0 oz     Comment: very rarely       ROS  10 point ROS negative except as listed above      OBJECTIVE:  /80   Pulse 100   Temp 98.1  F (36.7  C)   Resp 20   Wt 83.9 kg (185 lb)   SpO2 98%   BMI 31.26 kg/m    GENERAL APPEARANCE: healthy, alert and no distress  EYES: EOMI,  PERRL, conjunctiva clear  HENT: ear canals and TM's normal.  Nose and mouth without ulcers, erythema or lesions  NECK: supple, nontender, no lymphadenopathy  RESP: lungs clear to auscultation - no rales, rhonchi or wheezes  CV: regular rates and rhythm, normal S1 S2, no murmur noted  NEURO: Normal strength and tone, sensory exam grossly normal,  normal speech and mentation  SKIN: no suspicious lesions or rashes        ASSESSMENT:    (J20.9) Acute bronchitis with symptoms > 10 days  (primary encounter diagnosis)  Comment: no evidence of bacterial etiology  Plan: albuterol (PROAIR HFA/PROVENTIL HFA/VENTOLIN         HFA) 108 (90 Base) MCG/ACT inhaler    Patient Instructions     Follow up if not improving over the next 3-4 days        With distilled water 2-3x per day for a minimum 2-3 days  Mucinex, increased fluids, humidifier at night, steaming in the day  Cough drops and hot saltwater gargles as needed    Adult Self-Care for Colds  Colds are caused by viruses. They can't be cured with antibiotics. However, you can ease symptoms and support your body's efforts to heal itself.  No matter which symptoms you have, be sure to:    Drink plenty of fluids (water or clear soup)    Stop smoking and drinking alcohol    Get plenty of rest    Understand a fever    Take your temperature several  times a day. If your fever is 100.4 F (38.0 C) for more than a day, call your healthcare provider.    Relax, lie down. Go to bed if you want. Just get off your feet and rest. Also, drink plenty of fluids to avoid dehydration.    Take acetaminophen or a nonsteroidal anti-inflammatory agent (NSAID), such as ibuprofen.  Treat a troubled nose kindly    Breathe steam or heated humidified air to open blocked nasal passages.  a hot shower or use a vaporizer. Be careful not to get burned by the steam.    Saline nasal sprays and decongestant tablets help open a stuffy nose. Antihistamines can also help, but they can cause side effects such as drowsiness and drying of the eyes, nose, and mouth.  Soothe a sore throat and cough    Gargle every 2 hours with 1/4 teaspoon of salt dissolved in 1/2 cup of warm water. Suck on throat lozenges and cough drops to moisten your throat.    Cough medicines are available but it is unclear how well they actually work.    Take acetaminophen or an NSAID, such as ibuprofen, to ease throat pain  Ease digestive problems    Put fluids back into your body. Take frequent sips of clear liquids such as water or broth. Avoid drinks that have a lot of sugar in them, such as juices and sodas. These can make diarrhea worse. Older children and adults can drink sports drinks.    As your appetite returns, you can resume your normal diet. Ask your healthcare provider if there are any foods you should avoid.  When to seek medical care  When you first notice symptoms, ask your healthcare provider if antiviral medicines are appropriate. Antibiotics should not be taken for colds or flu. Also, call your healthcare provider if you have any of the following symptoms or if you aren't feeling better after 7 days:    Shortness of breath    Pain or pressure in the chest or belly (abdomen)    Worsening symptoms, especially after a period of improvement    Fever of 100.4 F  (38.0 C) or higher, or fever that doesn't  go down with medicine    Sudden dizziness or confusion    Severe or continued vomiting    Signs of dehydration, including extreme thirst, dark urine, infrequent urination, dry mouth    Spotted, red, or very sore throat   Date Last Reviewed: 12/1/2016 2000-2017 [employer]. [address] [place] [address]. All rights reserved. This information is not intended as a substitute for professional medical care. Always follow your healthcare professional's instructions.        Patient Education     Acute Bronchitis  Your healthcare provider has told you that you have acute bronchitis. Bronchitis is infection or inflammation of the bronchial tubes (airways in the lungs). Normally, air moves easily in and out of the airways. Bronchitis narrows the airways, making it harder for air to flow in and out of the lungs. This causes symptoms such as shortness of breath, coughing up yellow or green mucus, and wheezing. Bronchitis can be acute or chronic. Acute means the condition comes on quickly and goes away in a short time, usually within 3 to 10 days. Chronic means a condition lasts a long time and often comes back.    What causes acute bronchitis?  Acute bronchitis almost always starts as a viral respiratory infection, such as a cold or the flu. Certain factors make it more likely for a cold or flu to turn into bronchitis. These include being very young, being elderly, having a heart or lung problem, or having a weak immune system. Cigarette smoking also makes bronchitis more likely.  When bronchitis develops, the airways become swollen. The airways may also become infected with bacteria. This is known as a secondary infection.  Diagnosing acute bronchitis  Your healthcare provider will examine you and ask about your symptoms and health history. You may also have a sputum culture to test the fluid in your lungs. Chest X-rays may be done to look for infection in the lungs.  Treating acute  bronchitis  Bronchitis usually clears up as the cold or flu goes away. You can help feel better faster by doing the following:    Take medicine as directed. You may be told to take ibuprofen or other over-the-counter medicines. These help relieve inflammation in your bronchial tubes. Your healthcare provider may prescribe an inhaler to help open up the bronchial tubes. Most of the time, acute bronchitis is caused by a viral infection. Antibiotics are usually not prescribed for viral infections.    Drink plenty of fluids, such as water, juice, or warm soup. Fluids loosen mucus so that you can cough it up. This helps you breathe more easily. Fluids also prevent dehydration.    Make sure you get plenty of rest.    Do not smoke. Do not allow anyone else to smoke in your home.  Recovery and follow-up  Follow up with your doctor as you are told. You will likely feel better in a week or two. But a dry cough can linger beyond that time. Let your doctor know if you still have symptoms (other than a dry cough) after 2 weeks, or if you re prone to getting bronchial infections. Take steps to protect yourself from future infections. These steps include stopping smoking and avoiding tobacco smoke, washing your hands often, and getting a yearly flu shot.  When to call your healthcare provider  Call the healthcare provider if you have any of the following:    Fever of 100.4 F (38.0 C) or higher, or as advised    Symptoms that get worse, or new symptoms    Trouble breathing    Symptoms that don t start to improve within a week, or within 3 days of taking antibiotics   Date Last Reviewed: 12/1/2016 2000-2018 [employer]. [address] [place] [address], [place] [address]. All rights reserved. This information is not intended as a substitute for professional medical care. Always follow your healthcare professional's instructions.

## 2019-06-12 NOTE — PATIENT INSTRUCTIONS
Follow up if not improving over the next 3-4 days        With distilled water 2-3x per day for a minimum 2-3 days  Mucinex, increased fluids, humidifier at night, steaming in the day  Cough drops and hot saltwater gargles as needed    Adult Self-Care for Colds  Colds are caused by viruses. They can't be cured with antibiotics. However, you can ease symptoms and support your body's efforts to heal itself.  No matter which symptoms you have, be sure to:    Drink plenty of fluids (water or clear soup)    Stop smoking and drinking alcohol    Get plenty of rest    Understand a fever    Take your temperature several times a day. If your fever is 100.4 F (38.0 C) for more than a day, call your healthcare provider.    Relax, lie down. Go to bed if you want. Just get off your feet and rest. Also, drink plenty of fluids to avoid dehydration.    Take acetaminophen or a nonsteroidal anti-inflammatory agent (NSAID), such as ibuprofen.  Treat a troubled nose kindly    Breathe steam or heated humidified air to open blocked nasal passages.  a hot shower or use a vaporizer. Be careful not to get burned by the steam.    Saline nasal sprays and decongestant tablets help open a stuffy nose. Antihistamines can also help, but they can cause side effects such as drowsiness and drying of the eyes, nose, and mouth.  Soothe a sore throat and cough    Gargle every 2 hours with 1/4 teaspoon of salt dissolved in 1/2 cup of warm water. Suck on throat lozenges and cough drops to moisten your throat.    Cough medicines are available but it is unclear how well they actually work.    Take acetaminophen or an NSAID, such as ibuprofen, to ease throat pain  Ease digestive problems    Put fluids back into your body. Take frequent sips of clear liquids such as water or broth. Avoid drinks that have a lot of sugar in them, such as juices and sodas. These can make diarrhea worse. Older children and adults can drink sports drinks.    As your  appetite returns, you can resume your normal diet. Ask your healthcare provider if there are any foods you should avoid.  When to seek medical care  When you first notice symptoms, ask your healthcare provider if antiviral medicines are appropriate. Antibiotics should not be taken for colds or flu. Also, call your healthcare provider if you have any of the following symptoms or if you aren't feeling better after 7 days:    Shortness of breath    Pain or pressure in the chest or belly (abdomen)    Worsening symptoms, especially after a period of improvement    Fever of 100.4 F  (38.0 C) or higher, or fever that doesn't go down with medicine    Sudden dizziness or confusion    Severe or continued vomiting    Signs of dehydration, including extreme thirst, dark urine, infrequent urination, dry mouth    Spotted, red, or very sore throat   Date Last Reviewed: 12/1/2016 2000-2017 [employer]. [address] [place] [address]. All rights reserved. This information is not intended as a substitute for professional medical care. Always follow your healthcare professional's instructions.        Patient Education     Acute Bronchitis  Your healthcare provider has told you that you have acute bronchitis. Bronchitis is infection or inflammation of the bronchial tubes (airways in the lungs). Normally, air moves easily in and out of the airways. Bronchitis narrows the airways, making it harder for air to flow in and out of the lungs. This causes symptoms such as shortness of breath, coughing up yellow or green mucus, and wheezing. Bronchitis can be acute or chronic. Acute means the condition comes on quickly and goes away in a short time, usually within 3 to 10 days. Chronic means a condition lasts a long time and often comes back.    What causes acute bronchitis?  Acute bronchitis almost always starts as a viral respiratory infection, such as a cold or the flu. Certain factors make it more likely for a  cold or flu to turn into bronchitis. These include being very young, being elderly, having a heart or lung problem, or having a weak immune system. Cigarette smoking also makes bronchitis more likely.  When bronchitis develops, the airways become swollen. The airways may also become infected with bacteria. This is known as a secondary infection.  Diagnosing acute bronchitis  Your healthcare provider will examine you and ask about your symptoms and health history. You may also have a sputum culture to test the fluid in your lungs. Chest X-rays may be done to look for infection in the lungs.  Treating acute bronchitis  Bronchitis usually clears up as the cold or flu goes away. You can help feel better faster by doing the following:    Take medicine as directed. You may be told to take ibuprofen or other over-the-counter medicines. These help relieve inflammation in your bronchial tubes. Your healthcare provider may prescribe an inhaler to help open up the bronchial tubes. Most of the time, acute bronchitis is caused by a viral infection. Antibiotics are usually not prescribed for viral infections.    Drink plenty of fluids, such as water, juice, or warm soup. Fluids loosen mucus so that you can cough it up. This helps you breathe more easily. Fluids also prevent dehydration.    Make sure you get plenty of rest.    Do not smoke. Do not allow anyone else to smoke in your home.  Recovery and follow-up  Follow up with your doctor as you are told. You will likely feel better in a week or two. But a dry cough can linger beyond that time. Let your doctor know if you still have symptoms (other than a dry cough) after 2 weeks, or if you re prone to getting bronchial infections. Take steps to protect yourself from future infections. These steps include stopping smoking and avoiding tobacco smoke, washing your hands often, and getting a yearly flu shot.  When to call your healthcare provider  Call the healthcare provider if you  have any of the following:    Fever of 100.4 F (38.0 C) or higher, or as advised    Symptoms that get worse, or new symptoms    Trouble breathing    Symptoms that don t start to improve within a week, or within 3 days of taking antibiotics   Date Last Reviewed: 12/1/2016 2000-2018 [employer]. [address] [place] [address], [place] [address]. All rights reserved. This information is not intended as a substitute for professional medical care. Always follow your healthcare professional's instructions.

## 2019-07-08 DIAGNOSIS — I10 HYPERTENSION GOAL BP (BLOOD PRESSURE) < 140/90: ICD-10-CM

## 2019-07-08 RX ORDER — LISINOPRIL 10 MG/1
TABLET ORAL
Qty: 30 TABLET | Refills: 0 | Status: SHIPPED | OUTPATIENT
Start: 2019-07-08 | End: 2019-08-23

## 2019-07-08 NOTE — TELEPHONE ENCOUNTER
Routing refill request to provider for review/approval because:  A break in medication  Jacqueline Mccormick RN

## 2019-07-08 NOTE — TELEPHONE ENCOUNTER
"Requested Prescriptions   Pending Prescriptions Disp Refills     lisinopril (PRINIVIL/ZESTRIL) 10 MG tablet [Pharmacy Med Name: Lisinopril Oral Tablet 10 MG]  Last Written Prescription Date:  2/27/2019  Last Fill Quantity: 90 tablet,  # refills: 0   Last office visit: 6/11/2018 with prescribing provider:  Haase   Future Office Visit:     30 tablet 0     Sig: TAKE ONE TABLET BY MOUTH ONE TIME DAILY       ACE Inhibitors (Including Combos) Protocol Failed - 7/8/2019  7:01 AM        Failed - Blood pressure under 140/90 in past 12 months     BP Readings from Last 3 Encounters:   06/12/19 140/80   06/11/18 136/78   04/18/18 120/80                 Failed - Recent (12 mo) or future (30 days) visit within the authorizing provider's specialty     Patient had office visit in the last 12 months or has a visit in the next 30 days with authorizing provider or within the authorizing provider's specialty.  See \"Patient Info\" tab in inbasket, or \"Choose Columns\" in Meds & Orders section of the refill encounter.              Failed - Normal serum creatinine on file in past 12 months     Recent Labs   Lab Test 06/11/18  0830   CR 0.67             Failed - Normal serum potassium on file in past 12 months     Recent Labs   Lab Test 06/11/18  0830   POTASSIUM 4.0             Passed - Medication is active on med list        Passed - Patient is age 18 or older        Passed - No active pregnancy on record        Passed - No positive pregnancy test within past 12 months          "

## 2019-07-09 NOTE — TELEPHONE ENCOUNTER
A 30 day supply has been sent, will need a physical exam with labs prior to refills, please call to set up.  Thanks,  Susan Haase,CNP

## 2019-08-22 DIAGNOSIS — E78.5 HYPERLIPIDEMIA LDL GOAL <160: ICD-10-CM

## 2019-08-22 DIAGNOSIS — I10 HYPERTENSION GOAL BP (BLOOD PRESSURE) < 140/90: ICD-10-CM

## 2019-08-22 DIAGNOSIS — Z00.00 ROUTINE GENERAL MEDICAL EXAMINATION AT A HEALTH CARE FACILITY: ICD-10-CM

## 2019-08-22 DIAGNOSIS — E55.9 VITAMIN D DEFICIENCY DISEASE: Primary | ICD-10-CM

## 2019-08-22 NOTE — TELEPHONE ENCOUNTER
"Last Written Prescription Date:  7/08/19  Last Fill Quantity: 30 tablet,  # refills: 0   Last office visit: 6/11/2018 with prescribing provider:  Haase   Future Office Visit:      Requested Prescriptions   Pending Prescriptions Disp Refills     lisinopril (PRINIVIL/ZESTRIL) 10 MG tablet [Pharmacy Med Name: Lisinopril Oral Tablet 10 MG] 30 tablet 0     Sig: TAKE ONE TABLET BY MOUTH ONE TIME DAILY       ACE Inhibitors (Including Combos) Protocol Failed - 8/22/2019  4:11 PM        Failed - Blood pressure under 140/90 in past 12 months     BP Readings from Last 3 Encounters:   06/12/19 140/80   06/11/18 136/78   04/18/18 120/80                 Failed - Recent (12 mo) or future (30 days) visit within the authorizing provider's specialty     Patient had office visit in the last 12 months or has a visit in the next 30 days with authorizing provider or within the authorizing provider's specialty.  See \"Patient Info\" tab in inbasket, or \"Choose Columns\" in Meds & Orders section of the refill encounter.              Failed - Normal serum creatinine on file in past 12 months     Recent Labs   Lab Test 06/11/18  0830   CR 0.67             Failed - Normal serum potassium on file in past 12 months     Recent Labs   Lab Test 06/11/18  0830   POTASSIUM 4.0             Passed - Medication is active on med list        Passed - Patient is age 18 or older        Passed - No active pregnancy on record        Passed - No positive pregnancy test within past 12 months          "

## 2019-08-23 RX ORDER — LISINOPRIL 10 MG/1
TABLET ORAL
Qty: 30 TABLET | Refills: 0 | OUTPATIENT
Start: 2019-08-23

## 2019-08-23 RX ORDER — LISINOPRIL 10 MG/1
10 TABLET ORAL DAILY
Qty: 30 TABLET | Refills: 2 | Status: SHIPPED | OUTPATIENT
Start: 2019-08-23

## 2019-08-23 NOTE — TELEPHONE ENCOUNTER
Called patient and advised of need for visit.  [facility] was not aware it had been that long.  [facility] only gets 30 day Lisinopril due to insurance so did not notice it was limited supply.  No one called or sent letter 7/8/19 refill so she was not aware.  Moving in 2 weeks 100 miles away.  Will still come here for routine visits.  Scheduled her for 10/7/19 1:45 pm.  Wants fasting labs prior to moving so does not have to drive 100 miles fasting.  Discussed if concerns come up at visit another draw would be needed.  She states she is fine with that.  I t'd up what it looks like you have been doing.  You did do a A1C last year but I did not t that up so please add if you want.  Route to scheduling for lab appt when orders signed.  Thanks, Hailey Conde RN       Routed by: Haase, Susan Rachele, APRN CNP      on Mon Jul 8, 2019  8:24 PM        Routed to:                       Yen: Routine  on Mon Jul 08, 2019  8:24 PM               Cr Sb4 Silver Pal (Sw-Hc-Lt)      Opened by: Rocio Ramirez MSSOMMER *Pool member*     on Wed Jul 10, 2019 11:51 AM       Opened by: Rocio Ramirez MSW *Pool member*     on Tue Jul 16, 2019 12:57 PM       Opened by: Maren Grijalva LMFT *Pool mem* on Tue Jul 23, 2019  8:06 AM       Opened by: Rocio Ramirez MSSOMMER *Pool member*     on Fri Jul 26, 2019  8:58 AM        Doned by: Alicia Wesley *Pool member*      on Sun Jul 28, 2019  2:50 PM           Haase, Susan Rachele, APRN CNP           7/8/19 8:23 PM   Note      A 30 day supply has been sent, will need a physical exam with labs prior to refills, please call to set up.  Thanks,  Susan Haase,CNP        Viewed by Mary Kate Pacheco on 6/20/2018 11:18 AM   Written by Haase, Susan Rachele, APRN CNP on 6/12/2018 10:14 PM   Marcin Hartmann,   Your lab results are as below:   1)  TSH (thyroid level) 1.65 which is normal (range 0.4-4)   2)  Cholesterol was elevated at 219, your LDL (bad cholesterol) and your HDL (good cholesterol) were within normal range.  Continue to follow a low cholesterol diet and we will recheck this in 1 year.     If you have any questions do not hesitate to call the clinic to discuss the results with me further.     Sincerely,     Susan Haase, CNP

## 2019-08-23 NOTE — TELEPHONE ENCOUNTER
Routing refill request to provider for review/approval because:  Yeni given x1 and patient did not follow up, please advise  Patient needs to be seen because it has been more than 1 year since last office visit.  Dayanarahart sent    [place] RN, BSN

## 2019-08-23 NOTE — TELEPHONE ENCOUNTER
Spoke to pt via phone, let her know order has been signed and refill was sent.   CORINNE Corado, RN  Message handled by Nurse Triage

## 2019-08-23 NOTE — TELEPHONE ENCOUNTER
Please call Mary Kate and see if she wants to continue to be seen at our clinic, if so she needs to set up a visit for continued refills, arrive fasting.  Last visit 6/2018  Thanks,  Susan Haase, CNP

## 2019-09-29 ENCOUNTER — HEALTH MAINTENANCE LETTER (OUTPATIENT)
Age: 59
End: 2019-09-29

## 2019-12-13 ENCOUNTER — TELEPHONE (OUTPATIENT)
Dept: FAMILY MEDICINE | Facility: CLINIC | Age: 59
End: 2019-12-13

## 2019-12-13 NOTE — TELEPHONE ENCOUNTER
Panel Management Review  Summary:    Patient is due/failing the following:   BP CHECK and PHYSICAL    Reviewed:  [x] CARE EVERYWHERE  [x] LAST OV NOTE INCLUDING ENDO  [x] FYI TAB  [x] LAST PANEL ENCOUNTER  [x] FUTURE APTS  Action needed:   Patient needs office visit for BP CHECK and PHYSICAL        Type of outreach:    Phone, spoke to patient.  spoke to patient and offered appointment. Patient has established care else where.                                                                               Bella Parker MA

## 2021-01-14 ENCOUNTER — HEALTH MAINTENANCE LETTER (OUTPATIENT)
Age: 61
End: 2021-01-14

## 2021-10-23 ENCOUNTER — HEALTH MAINTENANCE LETTER (OUTPATIENT)
Age: 61
End: 2021-10-23

## 2022-02-12 ENCOUNTER — HEALTH MAINTENANCE LETTER (OUTPATIENT)
Age: 62
End: 2022-02-12

## 2022-10-10 ENCOUNTER — HEALTH MAINTENANCE LETTER (OUTPATIENT)
Age: 62
End: 2022-10-10

## 2023-03-25 ENCOUNTER — HEALTH MAINTENANCE LETTER (OUTPATIENT)
Age: 63
End: 2023-03-25

## 2023-08-20 ENCOUNTER — HEALTH MAINTENANCE LETTER (OUTPATIENT)
Age: 63
End: 2023-08-20

## (undated) DEVICE — NDL COUNTER 10CT

## (undated) DEVICE — SOL WATER IRRIG 3000ML BAG 2B7117

## (undated) DEVICE — RETR RING LONE STAR 16.6X16.2CM 3715

## (undated) DEVICE — CATH FOLEY 18FR 5ML LATEX 0165SI18

## (undated) DEVICE — LINEN TOWEL PACK X5 5464

## (undated) DEVICE — SU VICRYL 3-0 SH 27" J316H

## (undated) DEVICE — RETR ELASTIC STAYS LONE STAR SHARP 5MM 8/PACK 3311-8G

## (undated) DEVICE — PACK CYSTOSCOPY SBA15CYFSI

## (undated) DEVICE — GOWN IMPERVIOUS SPECIALTY XLG/XLONG 32474

## (undated) DEVICE — SOL ADH LIQUID BENZOIN SWAB 0.6ML C1544

## (undated) DEVICE — SUCTION TIP YANKAUER W/O VENT K86

## (undated) DEVICE — GLOVE PROTEXIS W/NEU-THERA 8.0  2D73TE80

## (undated) DEVICE — PACK TVT HYSTEROSCOPY SMA15HYFSE

## (undated) DEVICE — WIPES FOLEY CARE SURESTEP PROVON DFC100

## (undated) DEVICE — SOL NACL 0.9% IRRIG 1000ML BOTTLE 07138-09

## (undated) DEVICE — DECANTER VIAL 2006S

## (undated) DEVICE — CATH TRAY FOLEY SURESTEP 16FR W/URNE MTR STLK LATEX A303316A

## (undated) DEVICE — Device

## (undated) DEVICE — NDL SPINAL 25GA 3.5" QUINCKE 405180

## (undated) DEVICE — SYR 10ML SLIP TIP W/O NDL

## (undated) DEVICE — NDL SPINAL 22GA 3.5" QUINCKE 405181

## (undated) DEVICE — SUCTION CANISTER MEDIVAC LINER 3000ML W/LID 65651-530

## (undated) DEVICE — NDL 25GA 1.5" 305127

## (undated) DEVICE — SYR 10ML FINGER CONTROL W/O NDL 309695

## (undated) RX ORDER — FENTANYL CITRATE 50 UG/ML
INJECTION, SOLUTION INTRAMUSCULAR; INTRAVENOUS
Status: DISPENSED
Start: 2017-10-16

## (undated) RX ORDER — DEXAMETHASONE SODIUM PHOSPHATE 4 MG/ML
INJECTION, SOLUTION INTRA-ARTICULAR; INTRALESIONAL; INTRAMUSCULAR; INTRAVENOUS; SOFT TISSUE
Status: DISPENSED
Start: 2017-10-16

## (undated) RX ORDER — PROPOFOL 10 MG/ML
INJECTION, EMULSION INTRAVENOUS
Status: DISPENSED
Start: 2017-10-16

## (undated) RX ORDER — LIDOCAINE HYDROCHLORIDE 20 MG/ML
INJECTION, SOLUTION EPIDURAL; INFILTRATION; INTRACAUDAL; PERINEURAL
Status: DISPENSED
Start: 2017-10-16

## (undated) RX ORDER — ONDANSETRON 2 MG/ML
INJECTION INTRAMUSCULAR; INTRAVENOUS
Status: DISPENSED
Start: 2017-10-16